# Patient Record
Sex: FEMALE | Race: WHITE | Employment: PART TIME | URBAN - METROPOLITAN AREA
[De-identification: names, ages, dates, MRNs, and addresses within clinical notes are randomized per-mention and may not be internally consistent; named-entity substitution may affect disease eponyms.]

---

## 2022-03-19 PROBLEM — F33.1 MODERATE EPISODE OF RECURRENT MAJOR DEPRESSIVE DISORDER (HCC): Status: ACTIVE | Noted: 2020-12-16

## 2022-03-19 PROBLEM — Z77.120 MOLD EXPOSURE: Status: ACTIVE | Noted: 2020-10-14

## 2023-02-04 ENCOUNTER — HOSPITAL ENCOUNTER (EMERGENCY)
Age: 28
Discharge: HOME | End: 2023-02-04
Attending: EMERGENCY MEDICINE

## 2023-02-04 ENCOUNTER — APPOINTMENT (OUTPATIENT)
Dept: ULTRASOUND IMAGING | Age: 28
End: 2023-02-04
Attending: EMERGENCY MEDICINE

## 2023-02-04 VITALS
DIASTOLIC BLOOD PRESSURE: 73 MMHG | OXYGEN SATURATION: 97 % | HEIGHT: 63 IN | HEART RATE: 98 BPM | TEMPERATURE: 98.6 F | RESPIRATION RATE: 16 BRPM | WEIGHT: 202.16 LBS | SYSTOLIC BLOOD PRESSURE: 108 MMHG | BODY MASS INDEX: 35.82 KG/M2

## 2023-02-04 DIAGNOSIS — Z3A.13 13 WEEKS GESTATION OF PREGNANCY: ICD-10-CM

## 2023-02-04 DIAGNOSIS — R10.30 LOWER ABDOMINAL PAIN: Primary | ICD-10-CM

## 2023-02-04 LAB
APPEARANCE UR: ABNORMAL
BACTERIA URNS QL MICRO: ABNORMAL /HPF
BASOPHILS # BLD: 0 K/UL (ref 0–0.1)
BASOPHILS NFR BLD: 0 % (ref 0–1)
BILIRUB UR QL: NEGATIVE
COLOR UR: ABNORMAL
DIFFERENTIAL METHOD BLD: ABNORMAL
EOSINOPHIL # BLD: 0.1 K/UL (ref 0–0.4)
EOSINOPHIL NFR BLD: 1 % (ref 0–7)
EPITH CASTS URNS QL MICRO: ABNORMAL /LPF
ERYTHROCYTE [DISTWIDTH] IN BLOOD BY AUTOMATED COUNT: 13.5 % (ref 11.5–14.5)
GLUCOSE UR STRIP.AUTO-MCNC: NEGATIVE MG/DL
HCG SERPL-ACNC: ABNORMAL MIU/ML (ref 0–6)
HCT VFR BLD AUTO: 36.9 % (ref 35–47)
HGB BLD-MCNC: 12.9 G/DL (ref 11.5–16)
HGB UR QL STRIP: NEGATIVE
HYALINE CASTS URNS QL MICRO: ABNORMAL /LPF (ref 0–2)
IMM GRANULOCYTES # BLD AUTO: 0.1 K/UL (ref 0–0.04)
IMM GRANULOCYTES NFR BLD AUTO: 1 % (ref 0–0.5)
KETONES UR QL STRIP.AUTO: ABNORMAL MG/DL
LEUKOCYTE ESTERASE UR QL STRIP.AUTO: NEGATIVE
LYMPHOCYTES # BLD: 1.7 K/UL (ref 0.8–3.5)
LYMPHOCYTES NFR BLD: 17 % (ref 12–49)
MCH RBC QN AUTO: 30.9 PG (ref 26–34)
MCHC RBC AUTO-ENTMCNC: 35 G/DL (ref 30–36.5)
MCV RBC AUTO: 88.5 FL (ref 80–99)
MONOCYTES # BLD: 0.5 K/UL (ref 0–1)
MONOCYTES NFR BLD: 5 % (ref 5–13)
NEUTS SEG # BLD: 7.8 K/UL (ref 1.8–8)
NEUTS SEG NFR BLD: 76 % (ref 32–75)
NITRITE UR QL STRIP.AUTO: NEGATIVE
NRBC # BLD: 0 K/UL (ref 0–0.01)
NRBC BLD-RTO: 0 PER 100 WBC
PH UR STRIP: 8 (ref 5–8)
PLATELET # BLD AUTO: 216 K/UL (ref 150–400)
PMV BLD AUTO: 9.3 FL (ref 8.9–12.9)
PROT UR STRIP-MCNC: ABNORMAL MG/DL
RBC # BLD AUTO: 4.17 M/UL (ref 3.8–5.2)
RBC #/AREA URNS HPF: ABNORMAL /HPF (ref 0–5)
SP GR UR REFRACTOMETRY: 1.02 (ref 1–1.03)
UR CULT HOLD, URHOLD: NORMAL
UROBILINOGEN UR QL STRIP.AUTO: 1 EU/DL (ref 0.2–1)
WBC # BLD AUTO: 10.1 K/UL (ref 3.6–11)
WBC URNS QL MICRO: ABNORMAL /HPF (ref 0–4)

## 2023-02-04 PROCEDURE — 99284 EMERGENCY DEPT VISIT MOD MDM: CPT

## 2023-02-04 PROCEDURE — 36415 COLL VENOUS BLD VENIPUNCTURE: CPT

## 2023-02-04 PROCEDURE — 81001 URINALYSIS AUTO W/SCOPE: CPT

## 2023-02-04 PROCEDURE — 84702 CHORIONIC GONADOTROPIN TEST: CPT

## 2023-02-04 PROCEDURE — 85025 COMPLETE CBC W/AUTO DIFF WBC: CPT

## 2023-02-04 PROCEDURE — 76801 OB US < 14 WKS SINGLE FETUS: CPT

## 2023-02-04 RX ORDER — SODIUM CHLORIDE 0.9 % (FLUSH) 0.9 %
5-40 SYRINGE (ML) INJECTION EVERY 8 HOURS
Status: DISCONTINUED | OUTPATIENT
Start: 2023-02-04 | End: 2023-02-05 | Stop reason: HOSPADM

## 2023-02-04 RX ORDER — SODIUM CHLORIDE 0.9 % (FLUSH) 0.9 %
5-40 SYRINGE (ML) INJECTION AS NEEDED
Status: DISCONTINUED | OUTPATIENT
Start: 2023-02-04 | End: 2023-02-05 | Stop reason: HOSPADM

## 2023-02-05 NOTE — ED TRIAGE NOTES
Ambulatory to triage and reports she believes her mucus plug came out around. Pt states she is 13 weeks pregnant. Pt also reports back cramping but denies any vaginal bleeding.

## 2023-02-05 NOTE — ED PROVIDER NOTES
Healthy. G4, . She estimates that she is currently 13 weeks pregnant (LMP 2022). She presents after noticing clear vaginal discharge earlier today. She questions whether she could have \"lost my mucous plug. \"  Over the past hour, she has noted some back and lower abdominal cramping. No vaginal bleeding. Her appetite has been poor for some time. She denies urinary symptoms. No nausea or vomiting. No past medical history on file. No past surgical history on file. No family history on file. Social History     Socioeconomic History    Marital status:      Spouse name: Not on file    Number of children: Not on file    Years of education: Not on file    Highest education level: Not on file   Occupational History    Not on file   Tobacco Use    Smoking status: Not on file    Smokeless tobacco: Not on file   Substance and Sexual Activity    Alcohol use: Not on file    Drug use: Not on file    Sexual activity: Not on file   Other Topics Concern    Not on file   Social History Narrative    Not on file     Social Determinants of Health     Financial Resource Strain: Not on file   Food Insecurity: Not on file   Transportation Needs: Not on file   Physical Activity: Not on file   Stress: Not on file   Social Connections: Not on file   Intimate Partner Violence: Not on file   Housing Stability: Not on file         ALLERGIES: Patient has no known allergies. Review of Systems   All other systems reviewed and are negative. Vitals:    23 2126   BP: 110/61   Pulse: 97   Resp: 18   Temp: 98.7 °F (37.1 °C)   SpO2: 97%   Weight: 91.7 kg (202 lb 2.6 oz)   Height: 5' 3\" (1.6 m)            Physical Exam  Vitals and nursing note reviewed. Constitutional:       Appearance: She is well-developed. HENT:      Head: Normocephalic and atraumatic. Eyes:      Conjunctiva/sclera: Conjunctivae normal.   Neck:      Trachea: No tracheal deviation.    Cardiovascular:      Rate and Rhythm: Normal rate and regular rhythm. Heart sounds: Normal heart sounds. No murmur heard. No friction rub. No gallop. Pulmonary:      Effort: Pulmonary effort is normal.      Breath sounds: Normal breath sounds. Abdominal:      Palpations: Abdomen is soft. Tenderness: There is no abdominal tenderness. Musculoskeletal:         General: No deformity. Cervical back: Neck supple. Skin:     General: Skin is warm and dry. Neurological:      Mental Status: She is alert. Comments: oriented        Medical Decision Making  Amount and/or Complexity of Data Reviewed  Labs: ordered. Radiology: ordered. ECG/medicine tests: ordered. Risk  Prescription drug management. Procedures    Progress Note:  Results, treatment, and follow up plan have been discussed with patient. Questions were answered. Ritu Haas MD  11:22 PM    Assessment/plan: 13 weeks pregnant. She presented over concerns about potentially having lost her mucous plug. She also complains of low abdominal and low back pain. Differential diagnosis includes ectopic pregnancy, miscarriage, normal pregnancy, and others. Reassuring appearance/exam with stable vital signs. CBC, quant, UA, ultrasound okay. Home with OB follow-up. Return precautions.   Ritu Haas MD  11:23 PM

## 2023-02-05 NOTE — ED NOTES
Discharge instructions reviewed with patient and or family member, opportunity for questions provided, IV removed, patient ambulatory.

## 2023-06-30 ENCOUNTER — HOSPITAL ENCOUNTER (OUTPATIENT)
Facility: HOSPITAL | Age: 28
Discharge: HOME | End: 2023-06-30
Attending: FAMILY MEDICINE | Admitting: FAMILY MEDICINE

## 2023-06-30 VITALS
SYSTOLIC BLOOD PRESSURE: 108 MMHG | TEMPERATURE: 97.9 F | OXYGEN SATURATION: 100 % | OXYGEN SATURATION: 100 % | RESPIRATION RATE: 16 BRPM | RESPIRATION RATE: 16 BRPM | DIASTOLIC BLOOD PRESSURE: 58 MMHG | HEART RATE: 85 BPM | SYSTOLIC BLOOD PRESSURE: 108 MMHG | DIASTOLIC BLOOD PRESSURE: 58 MMHG | TEMPERATURE: 97.9 F | HEART RATE: 85 BPM

## 2023-06-30 LAB
ALBUMIN SERPL-MCNC: 2.6 G/DL (ref 3.5–5)
ALBUMIN SERPL-MCNC: 2.6 G/DL (ref 3.5–5)
ALBUMIN/GLOB SERPL: 0.9 (ref 1.1–2.2)
ALBUMIN/GLOB SERPL: 0.9 (ref 1.1–2.2)
ALP SERPL-CCNC: 144 U/L (ref 45–117)
ALP SERPL-CCNC: 144 U/L (ref 45–117)
ALT SERPL-CCNC: 32 U/L (ref 12–78)
ALT SERPL-CCNC: 32 U/L (ref 12–78)
ANION GAP SERPL CALC-SCNC: 5 MMOL/L (ref 5–15)
ANION GAP SERPL CALC-SCNC: 5 MMOL/L (ref 5–15)
AST SERPL-CCNC: 31 U/L (ref 15–37)
AST SERPL-CCNC: 31 U/L (ref 15–37)
BASOPHILS # BLD: 0 K/UL (ref 0–0.1)
BASOPHILS # BLD: 0 K/UL (ref 0–0.1)
BASOPHILS NFR BLD: 0 % (ref 0–1)
BASOPHILS NFR BLD: 0 % (ref 0–1)
BILIRUB SERPL-MCNC: 1.1 MG/DL (ref 0.2–1)
BILIRUB SERPL-MCNC: 1.1 MG/DL (ref 0.2–1)
BUN SERPL-MCNC: 2 MG/DL (ref 6–20)
BUN SERPL-MCNC: 2 MG/DL (ref 6–20)
BUN/CREAT SERPL: 4 (ref 12–20)
BUN/CREAT SERPL: 4 (ref 12–20)
CALCIUM SERPL-MCNC: 8.5 MG/DL (ref 8.5–10.1)
CALCIUM SERPL-MCNC: 8.5 MG/DL (ref 8.5–10.1)
CHLORIDE SERPL-SCNC: 112 MMOL/L (ref 97–108)
CHLORIDE SERPL-SCNC: 112 MMOL/L (ref 97–108)
CO2 SERPL-SCNC: 24 MMOL/L (ref 21–32)
CO2 SERPL-SCNC: 24 MMOL/L (ref 21–32)
CREAT SERPL-MCNC: 0.55 MG/DL (ref 0.55–1.02)
CREAT SERPL-MCNC: 0.55 MG/DL (ref 0.55–1.02)
DIFFERENTIAL METHOD BLD: ABNORMAL
DIFFERENTIAL METHOD BLD: ABNORMAL
EOSINOPHIL # BLD: 0.1 K/UL (ref 0–0.4)
EOSINOPHIL # BLD: 0.1 K/UL (ref 0–0.4)
EOSINOPHIL NFR BLD: 1 % (ref 0–7)
EOSINOPHIL NFR BLD: 1 % (ref 0–7)
ERYTHROCYTE [DISTWIDTH] IN BLOOD BY AUTOMATED COUNT: 14 % (ref 11.5–14.5)
ERYTHROCYTE [DISTWIDTH] IN BLOOD BY AUTOMATED COUNT: 14 % (ref 11.5–14.5)
GLOBULIN SER CALC-MCNC: 2.8 G/DL (ref 2–4)
GLOBULIN SER CALC-MCNC: 2.8 G/DL (ref 2–4)
GLUCOSE SERPL-MCNC: 78 MG/DL (ref 65–100)
GLUCOSE SERPL-MCNC: 78 MG/DL (ref 65–100)
HCT VFR BLD AUTO: 33.4 % (ref 35–47)
HCT VFR BLD AUTO: 33.4 % (ref 35–47)
HGB BLD-MCNC: 11.2 G/DL (ref 11.5–16)
HGB BLD-MCNC: 11.2 G/DL (ref 11.5–16)
IMM GRANULOCYTES # BLD AUTO: 0 K/UL (ref 0–0.04)
IMM GRANULOCYTES # BLD AUTO: 0 K/UL (ref 0–0.04)
IMM GRANULOCYTES NFR BLD AUTO: 0 % (ref 0–0.5)
IMM GRANULOCYTES NFR BLD AUTO: 0 % (ref 0–0.5)
LYMPHOCYTES # BLD: 1.5 K/UL (ref 0.8–3.5)
LYMPHOCYTES # BLD: 1.5 K/UL (ref 0.8–3.5)
LYMPHOCYTES NFR BLD: 21 % (ref 12–49)
LYMPHOCYTES NFR BLD: 21 % (ref 12–49)
MCH RBC QN AUTO: 30 PG (ref 26–34)
MCH RBC QN AUTO: 30 PG (ref 26–34)
MCHC RBC AUTO-ENTMCNC: 33.5 G/DL (ref 30–36.5)
MCHC RBC AUTO-ENTMCNC: 33.5 G/DL (ref 30–36.5)
MCV RBC AUTO: 89.5 FL (ref 80–99)
MCV RBC AUTO: 89.5 FL (ref 80–99)
MONOCYTES # BLD: 0.4 K/UL (ref 0–1)
MONOCYTES # BLD: 0.4 K/UL (ref 0–1)
MONOCYTES NFR BLD: 5 % (ref 5–13)
MONOCYTES NFR BLD: 5 % (ref 5–13)
NEUTS SEG # BLD: 5.3 K/UL (ref 1.8–8)
NEUTS SEG # BLD: 5.3 K/UL (ref 1.8–8)
NEUTS SEG NFR BLD: 73 % (ref 32–75)
NEUTS SEG NFR BLD: 73 % (ref 32–75)
NRBC # BLD: 0 K/UL (ref 0–0.01)
NRBC # BLD: 0 K/UL (ref 0–0.01)
NRBC BLD-RTO: 0 PER 100 WBC
NRBC BLD-RTO: 0 PER 100 WBC
PLATELET # BLD AUTO: 144 K/UL (ref 150–400)
PLATELET # BLD AUTO: 144 K/UL (ref 150–400)
PMV BLD AUTO: 10.4 FL (ref 8.9–12.9)
PMV BLD AUTO: 10.4 FL (ref 8.9–12.9)
POTASSIUM SERPL-SCNC: 4 MMOL/L (ref 3.5–5.1)
POTASSIUM SERPL-SCNC: 4 MMOL/L (ref 3.5–5.1)
PROT SERPL-MCNC: 5.4 G/DL (ref 6.4–8.2)
PROT SERPL-MCNC: 5.4 G/DL (ref 6.4–8.2)
RBC # BLD AUTO: 3.73 M/UL (ref 3.8–5.2)
RBC # BLD AUTO: 3.73 M/UL (ref 3.8–5.2)
SODIUM SERPL-SCNC: 141 MMOL/L (ref 136–145)
SODIUM SERPL-SCNC: 141 MMOL/L (ref 136–145)
WBC # BLD AUTO: 7.3 K/UL (ref 3.6–11)
WBC # BLD AUTO: 7.3 K/UL (ref 3.6–11)

## 2023-06-30 PROCEDURE — 85025 COMPLETE CBC W/AUTO DIFF WBC: CPT

## 2023-06-30 PROCEDURE — 80053 COMPREHEN METABOLIC PANEL: CPT

## 2023-06-30 PROCEDURE — 36415 COLL VENOUS BLD VENIPUNCTURE: CPT

## 2023-06-30 PROCEDURE — 99284 EMERGENCY DEPT VISIT MOD MDM: CPT

## 2023-07-06 ENCOUNTER — ROUTINE PRENATAL (OUTPATIENT)
Age: 28
End: 2023-07-06

## 2023-07-06 VITALS
DIASTOLIC BLOOD PRESSURE: 64 MMHG | BODY MASS INDEX: 37.03 KG/M2 | WEIGHT: 209 LBS | SYSTOLIC BLOOD PRESSURE: 98 MMHG | RESPIRATION RATE: 16 BRPM | HEIGHT: 63 IN | HEART RATE: 72 BPM | TEMPERATURE: 98 F | OXYGEN SATURATION: 98 %

## 2023-07-06 DIAGNOSIS — O99.210 OBESITY COMPLICATING PREGNANCY, UNSPECIFIED TRIMESTER: ICD-10-CM

## 2023-07-06 DIAGNOSIS — O09.90 SUPERVISION OF HIGH RISK PREGNANCY, ANTEPARTUM: Primary | ICD-10-CM

## 2023-07-06 DIAGNOSIS — O99.019 ANTEPARTUM ANEMIA: ICD-10-CM

## 2023-07-06 PROCEDURE — 90715 TDAP VACCINE 7 YRS/> IM: CPT | Performed by: FAMILY MEDICINE

## 2023-07-06 NOTE — PROGRESS NOTES
Chief Complaint   Patient presents with    Routine Prenatal Visit     Patient is 35 weeks. She is not having vaginal bleeding or discharge. She is taking prenatal vitamins. She is having fetal movement. Patient is having mild contractions. No other concerns. Dizzy, lightheaded - went to triage, orthostatics okay and told to increase hydration  feels like drinking a lot, but with heat told to drink more  8hr shifts, on feet and walking a lot - drinks  irregular eating       26yo  at 35w0d by L/13     IUP: Rh pos  declines genetic testing  dating U/S in alternate chart  anatomy okay  GTT okay  Tdap today  Thickened NF: noted on anatomy, okay at f/up scan  Anemia: Mild, HgB 10.7, repeat 11.2  Short Interval Pregnancy: last delivery in [place]    Obesity in Pregnancy: early GTT 81, A1C 4.1  counseled on ASA and declines  baseline PIH labs - plt 175, Cr 0.54, AST/ALT okay, UPC 0.1, growth with MFM  Anxiety and Depression: previously on 200mg zoloft  helped but didn't make her feel good, affects driving, trouble with focus  continue to monitor closely   Limited Social Support: lives with grandparents, FOB only during supervised visits?    Thombocytopenia: suspect gestational as previously normal, stable on recheck   Lightheadedness: review recent hospital notes/labs, encouraged fluids/regular meals       Family Planning: Paraguard (will go to outside clinic - now [facility], may want to go to private practice)     Estimated Date of Delivery: 23   Other Chart MRN: [id number]

## 2023-07-14 ENCOUNTER — ROUTINE PRENATAL (OUTPATIENT)
Age: 28
End: 2023-07-14

## 2023-07-14 ENCOUNTER — HOSPITAL ENCOUNTER (OUTPATIENT)
Facility: HOSPITAL | Age: 28
Discharge: HOME | End: 2023-07-14
Attending: INTERNAL MEDICINE | Admitting: OBSTETRICS & GYNECOLOGY
Payer: MEDICAID

## 2023-07-14 VITALS
HEART RATE: 88 BPM | OXYGEN SATURATION: 98 % | SYSTOLIC BLOOD PRESSURE: 121 MMHG | BODY MASS INDEX: 37.38 KG/M2 | DIASTOLIC BLOOD PRESSURE: 75 MMHG | WEIGHT: 211 LBS | RESPIRATION RATE: 16 BRPM | TEMPERATURE: 98.2 F

## 2023-07-14 VITALS — SYSTOLIC BLOOD PRESSURE: 120 MMHG | TEMPERATURE: 98.1 F | DIASTOLIC BLOOD PRESSURE: 66 MMHG | HEART RATE: 105 BPM

## 2023-07-14 DIAGNOSIS — O09.90 SUPERVISION OF HIGH RISK PREGNANCY, ANTEPARTUM: Primary | ICD-10-CM

## 2023-07-14 PROCEDURE — 99283 EMERGENCY DEPT VISIT LOW MDM: CPT

## 2023-07-14 PROCEDURE — 59025 FETAL NON-STRESS TEST: CPT

## 2023-07-14 NOTE — PROGRESS NOTES
Chief Complaint   Patient presents with    Routine Prenatal Visit     Normal FM, no LOF/VB  some contractions and pelvic pressure last night for a few hours     28yo  at 36w1d by L/13     IUP: Rh pos  declines genetic testing  dating U/S in alternate chart  anatomy okay  GTT okay  s/p Tdap  GBS today   Thickened NF: noted on anatomy, okay at f/up scan  Anemia: Mild, HgB 10.7, repeat 11.2  Short Interval Pregnancy: last delivery in [place]    Obesity in Pregnancy: early GTT 81, A1C 4.1  counseled on ASA and declines  baseline PIH labs - plt 175, Cr 0.54, AST/ALT okay, UPC 0.1, growth with MFM scheduled  Anxiety and Depression: previously on 200mg zoloft  helped but didn't make her feel good, affects driving, trouble with focus  continue to monitor closely   Limited Social Support: lives with grandparents, FOB only during supervised visits?    Thombocytopenia: suspect gestational as previously normal, stable on recheck   Lightheadedness: review recent hospital notes/labs, encouraged fluids/regular meals      Family Planning: Marisol (will go to outside clinic - now [facility], may want to go to private practice)    Estimated Date of Delivery: 23  Other Chart MRN: [id number]

## 2023-07-15 NOTE — H&P
[address], [address] [facility]   Office [phone], Fax [phone]      History & Physical    Name: Corinne Ho MRN: [id number]  SSN: [id number]    YOB: 1995  Age: 32 y.o. Sex: female      Subjective:     Reason for Admission:  Pregnancy and Rule out Labor    History of Present Illness: Ms. Daniel Yarbrough is a 32 y.o. [id number] with an estimated gestational age of 45w4d with Estimated Date of Delivery: 8/10/23. Patient complains of mild contractions that started 2 hours ago. Lasted for one 30 minute interval during which were felt \"every 1-1.5 minutes\". She no longer feels these contractions. At the time, she was walking around the mall for a few hours after a long day at work. Admits to bilateral leg swelling, worsened after spending long hours on her feet. Swelling goes away with leg elevation. Pregnancy has been complicated by Anemia (50/05: Hbg 10.7 > 11.2), Short Interval Pregnancy (last 2022), Obesity (GTT 81, A1c 4.1, decline Asprin use), Anxiety/Depression (not on medication), Thrombocytopenia (now stable). No loss of fluid. No vaginal bleeding. Strong fetal movement. Patient denies abdominal pain  , chest pain, headache , nausea and vomiting, right upper quadrant pain  , and shortness of breath. OB History    Para Term  AB Living   4 2 2 0 1 2   SAB IAB Ectopic Molar Multiple Live Births   1 0 0 0   2      # Outcome Date GA Lbr Oscar/2nd Weight Sex Delivery Anes PTL Lv   4 Current            3 Term 22    F Vag-Spont   SERGEI   2 Term 20 39w4d  2.885 kg (6 lb 5.8 oz) M Vag-Spont EPI N SERGEI   1 SAB              Past Medical History:   Diagnosis Date    Depression     Moderate episode of recurrent major depressive disorder ([address]) 2020    Postpartum depression 2020    Postpartum depression     Vaginal delivery 2020     No past surgical history on file.   Social History     Occupational History    Not on file   Tobacco Use

## 2023-07-15 NOTE — PROGRESS NOTES
2220  Patient arrives to L&D reporting many painful contractions today as she went to the mall with her children. 2225 SVE 2/50/-3  2240  resident MD at bedside assessing patient progress and the satrip. [facility] at bedside educating patient. 2315  Discharge instructions and teaching.   2318  patient left L&D

## 2023-07-16 LAB
BACTERIA SPEC CULT: NORMAL
SERVICE CMNT-IMP: NORMAL

## 2023-07-18 LAB
BACTERIA SPEC CULT: NORMAL
SERVICE CMNT-IMP: NORMAL

## 2023-07-21 ENCOUNTER — ROUTINE PRENATAL (OUTPATIENT)
Age: 28
End: 2023-07-21

## 2023-07-21 VITALS
TEMPERATURE: 97.7 F | HEART RATE: 84 BPM | BODY MASS INDEX: 37.56 KG/M2 | OXYGEN SATURATION: 98 % | DIASTOLIC BLOOD PRESSURE: 62 MMHG | SYSTOLIC BLOOD PRESSURE: 96 MMHG | RESPIRATION RATE: 18 BRPM | HEIGHT: 63 IN | WEIGHT: 212 LBS

## 2023-07-21 DIAGNOSIS — O99.019 ANTEPARTUM ANEMIA: ICD-10-CM

## 2023-07-21 DIAGNOSIS — O99.210 OBESITY COMPLICATING PREGNANCY, UNSPECIFIED TRIMESTER: ICD-10-CM

## 2023-07-21 DIAGNOSIS — O09.90 SUPERVISION OF HIGH RISK PREGNANCY, ANTEPARTUM: Primary | ICD-10-CM

## 2023-07-21 PROCEDURE — 0502F SUBSEQUENT PRENATAL CARE: CPT | Performed by: FAMILY MEDICINE

## 2023-07-21 NOTE — PROGRESS NOTES
Chief Complaint   Patient presents with    Routine Prenatal Visit     Patient is 37 weeks and 1 day. She is not having vaginal bleeding or discharge. She is having fetal movement. She is taking her prenatal vitamins. No contractions. No other concerns.        25yo G4 [id number] at 37w1d by L/13     IUP: Rh pos  declines genetic testing  dating U/S in alternate chart  anatomy okay  GTT okay  s/p Tdap  GBS neg   Thickened NF: noted on anatomy, okay at f/up scan  Anemia: Mild, HgB 10.7, repeat 11.2  Short Interval Pregnancy: last delivery in [place] 4/22   Obesity in Pregnancy: early GTT 81, A1C 4.1  counseled on ASA and declines  baseline PIH labs - plt 175, Cr 0.54, AST/ALT okay, UPC 0.1, growth with MFM scheduled 7/27  Anxiety and Depression: previously on 200mg zoloft  helped but didn't make her feel good, affects driving, trouble with focus  continue to monitor closely   Limited Social Support: only support is grandparents, works at [place], FOB only during supervised visits (sees some weekends when she works), in process of getting divorce  moved into home by herself  FOB has missed multiple court appearances for custody/child support   Thombocytopenia: suspect gestational as previously normal, stable on recheck 6/30 - 133>143>144     Family Planning: Rikki Kawasaki (will go to outside clinic - now [facility], may want to go to private practice)    Estimated Date of Delivery: 8/11/23  Other Chart MRN: [id number]

## 2023-07-26 ENCOUNTER — ROUTINE PRENATAL (OUTPATIENT)
Age: 28
End: 2023-07-26

## 2023-07-26 VITALS
TEMPERATURE: 97.8 F | RESPIRATION RATE: 18 BRPM | HEIGHT: 63 IN | SYSTOLIC BLOOD PRESSURE: 95 MMHG | WEIGHT: 212 LBS | HEART RATE: 67 BPM | DIASTOLIC BLOOD PRESSURE: 60 MMHG | OXYGEN SATURATION: 98 % | BODY MASS INDEX: 37.56 KG/M2

## 2023-07-26 DIAGNOSIS — O99.210 OBESITY COMPLICATING PREGNANCY, UNSPECIFIED TRIMESTER: ICD-10-CM

## 2023-07-26 DIAGNOSIS — O09.90 SUPERVISION OF HIGH RISK PREGNANCY, ANTEPARTUM: Primary | ICD-10-CM

## 2023-07-26 DIAGNOSIS — O99.019 ANTEPARTUM ANEMIA: ICD-10-CM

## 2023-07-26 PROCEDURE — 0502F SUBSEQUENT PRENATAL CARE: CPT | Performed by: FAMILY MEDICINE

## 2023-07-26 SDOH — ECONOMIC STABILITY: HOUSING INSECURITY
IN THE LAST 12 MONTHS, WAS THERE A TIME WHEN YOU DID NOT HAVE A STEADY PLACE TO SLEEP OR SLEPT IN A SHELTER (INCLUDING NOW)?: NO

## 2023-07-26 SDOH — ECONOMIC STABILITY: FOOD INSECURITY: WITHIN THE PAST 12 MONTHS, YOU WORRIED THAT YOUR FOOD WOULD RUN OUT BEFORE YOU GOT MONEY TO BUY MORE.: NEVER TRUE

## 2023-07-26 SDOH — ECONOMIC STABILITY: FOOD INSECURITY: WITHIN THE PAST 12 MONTHS, THE FOOD YOU BOUGHT JUST DIDN'T LAST AND YOU DIDN'T HAVE MONEY TO GET MORE.: NEVER TRUE

## 2023-07-26 SDOH — ECONOMIC STABILITY: INCOME INSECURITY: HOW HARD IS IT FOR YOU TO PAY FOR THE VERY BASICS LIKE FOOD, HOUSING, MEDICAL CARE, AND HEATING?: HARD

## 2023-07-26 ASSESSMENT — PATIENT HEALTH QUESTIONNAIRE - PHQ9
SUM OF ALL RESPONSES TO PHQ QUESTIONS 1-9: 6
1. LITTLE INTEREST OR PLEASURE IN DOING THINGS: 3
SUM OF ALL RESPONSES TO PHQ9 QUESTIONS 1 & 2: 6
2. FEELING DOWN, DEPRESSED OR HOPELESS: 3
SUM OF ALL RESPONSES TO PHQ QUESTIONS 1-9: 6

## 2023-07-26 NOTE — PROGRESS NOTES
Chief Complaint   Patient presents with    Routine Prenatal Visit     Patient is 37 weeks and 6 days. She is not having vaginal bleeding or discharge. She is taking her prenatal vitamins. She is having fetal movement. No contractions. Patient is having a lot of pain in her lower back. She also says she eels very sore on her pelvis. This started about a week ago. No other concerns.        25yo G4 [id number] at 37w6d by L/13     IUP: Rh pos  declines genetic testing  dating U/S in alternate chart  anatomy okay  GTT okay  s/p Tdap  GBS neg  6/80/-1, cephalic   Thickened NF: noted on anatomy, okay at f/up scan  Anemia: Mild, HgB 10.7, repeat 11.2  Short Interval Pregnancy: last delivery in [place] 4/22   Obesity in Pregnancy: early GTT 81, A1C 4.1  counseled on ASA and declines  baseline PIH labs - plt 175, Cr 0.54, AST/ALT okay, UPC 0.1, growth with MFM scheduled 7/27 - patient had to cancel and no availability to reschedule (largest baby [id number])  Anxiety and Depression: previously on 200mg zoloft  helped but didn't make her feel good, affects driving, trouble with focus  continue to monitor closely   Limited Social Support: lives with grandparents, FOB only during supervised visits, in process of getting divorce   Thombocytopenia: suspect gestational as previously normal, stable on recheck 6/30 - 133>143>144     Family Planning: Seferino Hanson (will go to outside clinic - now [facility], may want to go to private practice)    Estimated Date of Delivery: 8/11/23  Other Chart MRN: [id number]

## 2023-08-04 ENCOUNTER — ROUTINE PRENATAL (OUTPATIENT)
Age: 28
End: 2023-08-04

## 2023-08-04 VITALS
BODY MASS INDEX: 37.74 KG/M2 | TEMPERATURE: 98.1 F | HEART RATE: 75 BPM | HEIGHT: 63 IN | DIASTOLIC BLOOD PRESSURE: 67 MMHG | SYSTOLIC BLOOD PRESSURE: 106 MMHG | OXYGEN SATURATION: 99 % | RESPIRATION RATE: 18 BRPM | WEIGHT: 213 LBS

## 2023-08-04 DIAGNOSIS — O99.210 OBESITY COMPLICATING PREGNANCY, UNSPECIFIED TRIMESTER: ICD-10-CM

## 2023-08-04 DIAGNOSIS — O09.90 SUPERVISION OF HIGH RISK PREGNANCY, ANTEPARTUM: Primary | ICD-10-CM

## 2023-08-04 NOTE — PROGRESS NOTES
Chief Complaint   Patient presents with    Routine Prenatal Visit     Patient is 39 weeks. She is not having vaginal bleeding or discharge. She is taking her prenatal vitamins. She is having fetal movement. No contractions. She said part o her mucous plug came out. No other concerns.        27yo G4 [id number] at 39w0d by L/13     IUP: Rh pos  declines genetic testing  dating U/S in alternate chart  anatomy okay  GTT okay  s/p Tdap  GBS neg  7/33/-5, cephalic  membrane sweep today - reviewed risks/benefits prior   PDIOL scheduled 8/17 to arrive 6:30am (full earlier in week)  Thickened NF: noted on anatomy, okay at f/up scan  Anemia: Mild, HgB 10.7, repeat 11.2  Short Interval Pregnancy: last delivery in [place] 4/22   Obesity in Pregnancy: early GTT 81, A1C 4.1  counseled on ASA and declines  baseline PIH labs - plt 175, Cr 0.54, AST/ALT okay, UPC 0.1, growth with MFM scheduled 7/27 - patient had to cancel and no availability to reschedule (largest baby [id number])  Anxiety and Depression: previously on 200mg zoloft  helped but didn't make her feel good, affects driving, trouble with focus  continue to monitor closely   Limited Social Support: lives with grandparents, FOB only during supervised visits, in process of getting divorce   Thombocytopenia: suspect gestational as previously normal, stable on recheck 6/30 - 133>143>144     Family Planning: Aila Villarreal (will go to outside clinic - now [facility], may want to go to private practice)    Estimated Date of Delivery: 8/11/23  Other Chart MRN: [id number]

## 2023-08-11 ENCOUNTER — HOSPITAL ENCOUNTER (INPATIENT)
Facility: HOSPITAL | Age: 28
LOS: 2 days | Discharge: HOME | DRG: 542 | End: 2023-08-13
Attending: FAMILY MEDICINE | Admitting: FAMILY MEDICINE
Payer: MEDICAID

## 2023-08-11 PROBLEM — Z37.9 NORMAL LABOR: Status: ACTIVE | Noted: 2023-08-11

## 2023-08-11 LAB
ABO + RH BLD: NORMAL
BASOPHILS # BLD: 0 K/UL (ref 0–0.1)
BASOPHILS NFR BLD: 1 % (ref 0–1)
BLOOD GROUP ANTIBODIES SERPL: NORMAL
DIFFERENTIAL METHOD BLD: ABNORMAL
EOSINOPHIL # BLD: 0.1 K/UL (ref 0–0.4)
EOSINOPHIL NFR BLD: 1 % (ref 0–7)
ERYTHROCYTE [DISTWIDTH] IN BLOOD BY AUTOMATED COUNT: 14.3 % (ref 11.5–14.5)
HCT VFR BLD AUTO: 31.1 % (ref 35–47)
HGB BLD-MCNC: 10.6 G/DL (ref 11.5–16)
IMM GRANULOCYTES # BLD AUTO: 0 K/UL (ref 0–0.04)
IMM GRANULOCYTES NFR BLD AUTO: 0 % (ref 0–0.5)
LYMPHOCYTES # BLD: 2.3 K/UL (ref 0.8–3.5)
LYMPHOCYTES NFR BLD: 29 % (ref 12–49)
MCH RBC QN AUTO: 28.3 PG (ref 26–34)
MCHC RBC AUTO-ENTMCNC: 34.1 G/DL (ref 30–36.5)
MCV RBC AUTO: 83.2 FL (ref 80–99)
MONOCYTES # BLD: 0.5 K/UL (ref 0–1)
MONOCYTES NFR BLD: 6 % (ref 5–13)
NEUTS SEG # BLD: 5.1 K/UL (ref 1.8–8)
NEUTS SEG NFR BLD: 63 % (ref 32–75)
NRBC # BLD: 0 K/UL (ref 0–0.01)
NRBC BLD-RTO: 0 PER 100 WBC
PLATELET # BLD AUTO: 135 K/UL (ref 150–400)
PMV BLD AUTO: 12 FL (ref 8.9–12.9)
RBC # BLD AUTO: 3.74 M/UL (ref 3.8–5.2)
SPECIMEN EXP DATE BLD: NORMAL
WBC # BLD AUTO: 8.1 K/UL (ref 3.6–11)

## 2023-08-11 PROCEDURE — 85025 COMPLETE CBC W/AUTO DIFF WBC: CPT

## 2023-08-11 PROCEDURE — 2500000003 HC RX 250 WO HCPCS

## 2023-08-11 PROCEDURE — 86900 BLOOD TYPING SEROLOGIC ABO: CPT

## 2023-08-11 PROCEDURE — 36415 COLL VENOUS BLD VENIPUNCTURE: CPT

## 2023-08-11 PROCEDURE — 4500000002 HC ER NO CHARGE

## 2023-08-11 PROCEDURE — 4A1HXCZ MONITORING OF PRODUCTS OF CONCEPTION, CARDIAC RATE, EXTERNAL APPROACH: ICD-10-PCS

## 2023-08-11 PROCEDURE — 2580000003 HC RX 258

## 2023-08-11 PROCEDURE — 0DQR0ZZ REPAIR ANAL SPHINCTER, OPEN APPROACH: ICD-10-PCS

## 2023-08-11 PROCEDURE — 1100000000 HC RM PRIVATE

## 2023-08-11 PROCEDURE — 86901 BLOOD TYPING SEROLOGIC RH(D): CPT

## 2023-08-11 PROCEDURE — 7220000101 HC DELIVERY VAGINAL/SINGLE: Performed by: OBSTETRICS & GYNECOLOGY

## 2023-08-11 PROCEDURE — 86850 RBC ANTIBODY SCREEN: CPT

## 2023-08-11 PROCEDURE — 6370000000 HC RX 637 (ALT 250 FOR IP): Performed by: OBSTETRICS & GYNECOLOGY

## 2023-08-11 PROCEDURE — 6370000000 HC RX 637 (ALT 250 FOR IP)

## 2023-08-11 PROCEDURE — 6360000002 HC RX W HCPCS

## 2023-08-11 PROCEDURE — 7210000100 HC LABOR FEE PER 1 HR: Performed by: OBSTETRICS & GYNECOLOGY

## 2023-08-11 PROCEDURE — 99285 EMERGENCY DEPT VISIT HI MDM: CPT

## 2023-08-11 RX ORDER — SODIUM CHLORIDE, SODIUM LACTATE, POTASSIUM CHLORIDE, CALCIUM CHLORIDE 600; 310; 30; 20 MG/100ML; MG/100ML; MG/100ML; MG/100ML
INJECTION, SOLUTION INTRAVENOUS CONTINUOUS
Status: DISCONTINUED | OUTPATIENT
Start: 2023-08-11 | End: 2023-08-13 | Stop reason: HOSPADM

## 2023-08-11 RX ORDER — IBUPROFEN 800 MG/1
800 TABLET ORAL EVERY 8 HOURS PRN
Status: DISCONTINUED | OUTPATIENT
Start: 2023-08-11 | End: 2023-08-13 | Stop reason: HOSPADM

## 2023-08-11 RX ORDER — SODIUM CHLORIDE 0.9 % (FLUSH) 0.9 %
5-40 SYRINGE (ML) INJECTION EVERY 12 HOURS SCHEDULED
Status: DISCONTINUED | OUTPATIENT
Start: 2023-08-11 | End: 2023-08-13 | Stop reason: HOSPADM

## 2023-08-11 RX ORDER — SODIUM CHLORIDE 9 MG/ML
INJECTION, SOLUTION INTRAVENOUS PRN
Status: DISCONTINUED | OUTPATIENT
Start: 2023-08-11 | End: 2023-08-13 | Stop reason: HOSPADM

## 2023-08-11 RX ORDER — DOCUSATE SODIUM 100 MG/1
100 CAPSULE, LIQUID FILLED ORAL 2 TIMES DAILY
Status: DISCONTINUED | OUTPATIENT
Start: 2023-08-11 | End: 2023-08-13 | Stop reason: HOSPADM

## 2023-08-11 RX ORDER — POLYETHYLENE GLYCOL 3350 17 G/17G
17 POWDER, FOR SOLUTION ORAL DAILY
Status: DISCONTINUED | OUTPATIENT
Start: 2023-08-11 | End: 2023-08-12

## 2023-08-11 RX ORDER — SODIUM CHLORIDE 0.9 % (FLUSH) 0.9 %
5-40 SYRINGE (ML) INJECTION PRN
Status: DISCONTINUED | OUTPATIENT
Start: 2023-08-11 | End: 2023-08-13 | Stop reason: HOSPADM

## 2023-08-11 RX ORDER — IBUPROFEN 800 MG/1
TABLET ORAL
Status: DISPENSED
Start: 2023-08-11 | End: 2023-08-12

## 2023-08-11 RX ORDER — IBUPROFEN 800 MG/1
800 TABLET ORAL EVERY 8 HOURS SCHEDULED
Status: DISCONTINUED | OUTPATIENT
Start: 2023-08-11 | End: 2023-08-13 | Stop reason: HOSPADM

## 2023-08-11 RX ORDER — LANOLIN/MINERAL OIL
LOTION (ML) TOPICAL PRN
Status: DISCONTINUED | OUTPATIENT
Start: 2023-08-11 | End: 2023-08-13 | Stop reason: HOSPADM

## 2023-08-11 RX ORDER — OXYCODONE HYDROCHLORIDE 5 MG/1
10 TABLET ORAL EVERY 4 HOURS PRN
Status: DISCONTINUED | OUTPATIENT
Start: 2023-08-11 | End: 2023-08-13 | Stop reason: HOSPADM

## 2023-08-11 RX ORDER — OXYCODONE HYDROCHLORIDE 5 MG/1
5 TABLET ORAL EVERY 4 HOURS PRN
Status: DISCONTINUED | OUTPATIENT
Start: 2023-08-11 | End: 2023-08-13 | Stop reason: HOSPADM

## 2023-08-11 RX ADMIN — IBUPROFEN 800 MG: 800 TABLET, FILM COATED ORAL at 14:15

## 2023-08-11 RX ADMIN — LIDOCAINE HYDROCHLORIDE 10 ML: 10 INJECTION, SOLUTION EPIDURAL; INFILTRATION; INTRACAUDAL; PERINEURAL at 05:00

## 2023-08-11 RX ADMIN — OXYTOCIN 30 UNITS: 10 INJECTION, SOLUTION INTRAMUSCULAR; INTRAVENOUS at 05:00

## 2023-08-11 RX ADMIN — IBUPROFEN 800 MG: 800 TABLET, FILM COATED ORAL at 05:34

## 2023-08-11 ASSESSMENT — PAIN DESCRIPTION - LOCATION: LOCATION: VAGINA

## 2023-08-11 ASSESSMENT — PAIN SCALES - GENERAL: PAINLEVEL_OUTOF10: 3

## 2023-08-11 ASSESSMENT — PAIN DESCRIPTION - DESCRIPTORS: DESCRIPTORS: ACHING

## 2023-08-11 NOTE — LACTATION NOTE
This note was copied from a baby's chart. Mother reports that nursing is going well. This is her third child to breast feed. She is currently nursing in a cradle hold. LC changed mother's position to biological hold to be more comfortable for both mom and baby. Mother encouraged to breast feed baby on demand or every 2-3 hours. Hand expression encouraged. A breastfeeding booklet was provided. Biological Nurturing breastfeeding principles taught. How Biological Nurturing (BN)promotes optimal breastfeeding (BF) sessions discussed. Mother encouraged to seek comfortable semi-reclining breastfeeding positions. Infant placed frontally along maternal contour. Primitive innate feeding reflexes/behaviors of the  discussed. BN tips and techniques shared; assisted with comfortable breastfeeding positioning. Pt will successfully establish breastfeeding by feeding in response to early feeding cues   or wake every 3h, will obtain deep latch, and will keep log of feedings/output. Taught to BF at hunger cues and or q 2-3 hrs and to offer 10-20 drops of hand expressed colostrum at any non-feeds. Left Breast: Soft  Left Nipple: Protrude  Right Nipple: Protrude  Right Breast: Soft  Position and Latch: With assistance     Maternal Response:  Comfortable with position           Latch: Repeated attempts, hold nipple in mouth, stimulate to suck  Audible Swallowing: None  Type of Nipple: Everted (after stimulation)  Comfort (Breast/Nipple): Soft/non-tender  Hold (Positioning): Full assist, teach one side, mother does other, staff holds  [facility] Score: 6     Care Plan Initiated: Reluctant nurser  Lactation Comment: [address] worked with mother nursing in the [address] [place] [address] position      Discussed with mother her plan for feeding. Reviewed the benefits of exclusive breast milk feeding during the hospital stay.    Informed her of the risks of using formula to supplement in the first few days of life as well as the

## 2023-08-11 NOTE — PROGRESS NOTES
5115: Pt arrived to L&D reporting frequent painful contractions and LOF. Pt denies VB, DFM, or complications with her pregnancy    0450: Dr. Karishma Aguilar called to bedside. Patient actively pushing. RN remains in continuous attendance at the bedside. Assessment & evaluation of fetal heart rate ongoing via continuous EFM. 12: RN remained at bedside throughout pushing. EFM continuously assessed. Vaginal delivery of viable infant.   ml

## 2023-08-11 NOTE — H&P
[facility] [address] [place] [address]   Office [phone], Fax [phone]      History & Physical    NOTE: This H&P was written after the delivery. Name: Alexey Hsu MRN: [id number]  SSN: [id number]    YOB: 1995  Age: 32 y.o. Sex: female      Subjective:     Reason for Admission:  Pregnancy and contractions and SROM    History of Present Illness: Ms. Cyrus العراقي is a 32 y.o.   female with an estimated gestational age of 37w0d with Estimated Date of Delivery: 23. Patient complains of SROM while asleep and contractions that started at 3 AM today. OB History    Para Term  AB Living   4 2 2 0 1 2   SAB IAB Ectopic Molar Multiple Live Births   1 0 0 0   2      # Outcome Date GA Lbr Oscar/2nd Weight Sex Delivery Anes PTL Lv   4 Current            3 Term 22    F Vag-Spont   SERGEI   2 Term 20 39w4d  2.885 kg (6 lb 5.8 oz) M Vag-Spont EPI N SERGEI   1 SAB              Past Medical History:   Diagnosis Date    Depression     Moderate episode of recurrent major depressive disorder ([address]) 2020    Postpartum depression 2020    Postpartum depression     Vaginal delivery 2020     No past surgical history on file. Social History     Occupational History    Not on file   Tobacco Use    Smoking status: Former     Packs/day: 0.50     Types: Cigarettes     Quit date: 2020     Years since quitting: 3.3    Smokeless tobacco: Never    Tobacco comments:     Quit smoking: patient vapes   Substance and Sexual Activity    Alcohol use: Not Currently    Drug use: Never    Sexual activity: Not on file      Family History   Problem Relation Age of Onset    Diabetes Father     Other Mother         fibroids, hysterectomy    Diabetes Paternal Grandfather     Diabetes Maternal Grandfather        No Known Allergies  Prior to Admission medications    Medication Sig Start Date End Date Taking?  Authorizing Provider   ferrous sulfate 324 (65 Fe) MG

## 2023-08-11 NOTE — L&D DELIVERY NOTE
Patient progressed well to C/C/+2 and pushed for approximately 5 min w/  over a 3rd degree perineal laceration of a liveborn female infant, Apgar scores were 8/9. Meconium stained large amount of fluid after delivery of head. Head delivered slightly JUAN CARLOS. Anterior left shoulder delivered w/ single maternal effort w/ posterior shoulder and body following easily. Cord wrapped around the trunk, manually reduced. Infant placed on maternal abdomen. Delayed cord clamping x 1 min. Cord clamped x 2 and cut by family. Pitocin infusion initiated. Meconium stained placenta delivered spontaneously intact w/ 3 v cord. Perineum inspected. Fundus firm at U. Mother and baby bonding in LDR. Delivery QBL 200cc. Mason Welch [[id number]]      Labor Events     Labor: No   Steroids: None  Cervical Ripening Date/Time:      Antibiotics Received during Labor: No  Rupture Identifier: Sac 1  Rupture Date/Time:      Rupture Type: SROM  Meconium Consistency: Moderate  Fluid Odor: None  Fluid Volume:  Moderate  Induction: None  Augmentation: None  Labor Complications: None              Anesthesia    Method: None       Delivery Details      Delivery Date: 23 Delivery Time: 04:55:00   Delivery Type: Vaginal, Spontaneous              Hallock Presentation    Presentation: Vertex       Shoulder Dystocia    Shoulder Dystocia Present?: No       Assisted Delivery Details    Forceps Attempted?: No  Vacuum Extractor Attempted?: No                           Cord    Vessels: 3 Vessels  Complications: None  Delayed Cord Clamping?: Yes  Cord Clamped Date/Time: 2023 04:56:00  Cord Blood Disposition: Lab  Gases Sent?: No              Placenta    Date/Time: 2023 05:01:00  Removal: Expressed  Appearance: Intact  Disposition: Discarded       Lacerations    Episiotomy: None  Perineal Lacerations: 2nd  Other Lacerations: no non-perineal laceration       Vaginal Counts    Initial Count Personnel: Kirt Stubbs RN  Initial

## 2023-08-11 NOTE — CARE COORDINATION
8/11/23  3:19 PM    CM met with LC to complete the initial evaluation. Confirmed charted demographics. LC is able to stay at home with the baby for six weeks. She plans to breast feed and bottle feed and has a pump. LC has all of the necessary equipment for the baby including a car seat, crib, clothing, etc. LC is aware of how to add the baby to her health insurance is [facility] pediatric[facility]. LC has the support of her mother and grandmother if she should need anything. She had questions regarding early intervention services- CM added the information to the AVS.     [facility] Manager     08/11/23 [id number]   Service Assessment   Patient Orientation Alert and Oriented   Cognition Alert   History Provided By Patient   Primary Caregiver [address] Parent; Family Members   Prior Functional Level Independent in ADLs/IADLs   Current Functional Level Independent in ADLs/IADLs   Would you like for me to discuss the discharge plan with any other family members/significant others, and if so, who?  No   Services At/After Discharge   Mode of Transport at Discharge Other (see comment)  (family will transport MOB and baby at [place])

## 2023-08-12 PROCEDURE — 1100000000 HC RM PRIVATE

## 2023-08-12 PROCEDURE — 6370000000 HC RX 637 (ALT 250 FOR IP)

## 2023-08-12 RX ORDER — POLYETHYLENE GLYCOL 3350 17 G/17G
17 POWDER, FOR SOLUTION ORAL DAILY PRN
Status: DISCONTINUED | OUTPATIENT
Start: 2023-08-12 | End: 2023-08-13 | Stop reason: HOSPADM

## 2023-08-12 RX ADMIN — IBUPROFEN 800 MG: 800 TABLET, FILM COATED ORAL at 20:25

## 2023-08-12 RX ADMIN — IBUPROFEN 800 MG: 800 TABLET, FILM COATED ORAL at 12:16

## 2023-08-12 RX ADMIN — SERTRALINE 50 MG: 50 TABLET, FILM COATED ORAL at 12:15

## 2023-08-12 RX ADMIN — DOCUSATE SODIUM 100 MG: 100 CAPSULE, LIQUID FILLED ORAL at 08:11

## 2023-08-12 ASSESSMENT — PAIN DESCRIPTION - DESCRIPTORS
DESCRIPTORS: ACHING;BURNING;CRAMPING
DESCRIPTORS: SORE

## 2023-08-12 ASSESSMENT — PAIN SCALES - GENERAL
PAINLEVEL_OUTOF10: 3
PAINLEVEL_OUTOF10: 3

## 2023-08-12 ASSESSMENT — PAIN DESCRIPTION - ORIENTATION: ORIENTATION: LOWER

## 2023-08-12 ASSESSMENT — PAIN DESCRIPTION - LOCATION
LOCATION: PERINEUM
LOCATION: ABDOMEN

## 2023-08-12 NOTE — LACTATION NOTE
This note was copied from a baby's chart. LC in to follow up with feedings. Family member was formula feeding baby. Mother states she changed her mind about breastfeeding and now only wants to formula feed baby. LC discussed hand expressing drops of colostrum and or pumping to give baby her antibodies. Mother states she will call [address] if she changes her mind. Mother has LC#.

## 2023-08-13 VITALS
DIASTOLIC BLOOD PRESSURE: 75 MMHG | HEART RATE: 53 BPM | RESPIRATION RATE: 16 BRPM | OXYGEN SATURATION: 98 % | SYSTOLIC BLOOD PRESSURE: 124 MMHG | TEMPERATURE: 98.8 F

## 2023-08-13 PROCEDURE — 6370000000 HC RX 637 (ALT 250 FOR IP)

## 2023-08-13 RX ORDER — HYDROCODONE BITARTRATE AND ACETAMINOPHEN 5; 325 MG/1; MG/1
1 TABLET ORAL EVERY 8 HOURS PRN
Qty: 18 TABLET | Refills: 0 | Status: SHIPPED | OUTPATIENT
Start: 2023-08-13 | End: 2023-08-28

## 2023-08-13 RX ORDER — PSEUDOEPHEDRINE HCL 30 MG
100 TABLET ORAL 4 TIMES DAILY PRN
Qty: 20 CAPSULE | Refills: 0 | Status: SHIPPED | OUTPATIENT
Start: 2023-08-13

## 2023-08-13 RX ORDER — IBUPROFEN 800 MG/1
800 TABLET ORAL EVERY 8 HOURS SCHEDULED
Qty: 120 TABLET | Refills: 3 | Status: SHIPPED | OUTPATIENT
Start: 2023-08-13

## 2023-08-13 RX ADMIN — IBUPROFEN 800 MG: 800 TABLET, FILM COATED ORAL at 04:34

## 2023-08-13 ASSESSMENT — PAIN DESCRIPTION - LOCATION: LOCATION: PERINEUM

## 2023-08-13 ASSESSMENT — PAIN SCALES - GENERAL: PAINLEVEL_OUTOF10: 4

## 2023-08-13 ASSESSMENT — PAIN DESCRIPTION - DESCRIPTORS: DESCRIPTORS: SORE

## 2023-08-13 NOTE — DISCHARGE INSTRUCTIONS
contact my physician. These instructions were explained to me and I had the opportunity to ask questions. I understand and acknowledge receipt of the instructions indicated above.                                                                                                                                                Physician's or R.N.'s Signature                                                                  Date/Time                                                                                                                                              Patient or Representative Signature                                                          Date/Time

## 2023-08-14 ENCOUNTER — TELEPHONE (OUTPATIENT)
Age: 28
End: 2023-08-14

## 2023-08-14 NOTE — TELEPHONE ENCOUNTER
I called the patient to follow up on the postpartum depression discussion we had in the office when she came in for the 3 day well child visit of her baby. EPDS score was 23. No SI/HI. She was discharged from the hospital on 8/13 and EPDS score was 24. She was prescribed zoloft which she hasn't picked up yet but will do that today. She had had postpartum depression in the past as well. I spoke to Dr Js White regarding this and given the level of concern that she would benefit from being seen in clinic in 1 week to manage this. Patient said she would not like to come in that soon and would like to keep her regular 2 week postpartum visit on 8/25/23. I informed her that I can send in a my chart message with hotline crisis numbers if needed and let her know that if she needs any help that she can always reach out to us as well. Addressed all questions and concerns. She verbalizes understanding.    Yfn Jasso MD  Family Medicine resident

## 2023-08-25 ENCOUNTER — OFFICE VISIT (OUTPATIENT)
Age: 28
End: 2023-08-25
Payer: MEDICAID

## 2023-08-25 VITALS
WEIGHT: 185 LBS | HEART RATE: 63 BPM | TEMPERATURE: 97.9 F | OXYGEN SATURATION: 99 % | DIASTOLIC BLOOD PRESSURE: 57 MMHG | BODY MASS INDEX: 32.77 KG/M2 | SYSTOLIC BLOOD PRESSURE: 92 MMHG

## 2023-08-25 PROCEDURE — 59426 ANTEPARTUM CARE ONLY: CPT | Performed by: FAMILY MEDICINE

## 2023-08-25 RX ORDER — SERTRALINE HYDROCHLORIDE 100 MG/1
100 TABLET, FILM COATED ORAL
Qty: 30 TABLET | Refills: 1 | Status: SHIPPED | OUTPATIENT
Start: 2023-08-25

## 2023-08-25 NOTE — PROGRESS NOTES
[address] [facility] Medicine Office Visit     Assessment/ Plan: Izaiah Shearer is a [address] y.o. female presenting for:    Postpartum Visit - Doing well. -- plan for family planning: likely Copper IUD, info given to schedule  -- EPDS 24, see below     Postpartum Depression, Anxiety - Recurrent. Feels that zoloft is helping but would like to go up to higher dose that was on previously. Has good support from grandparents but on the whole raising three kids as a single parent. Thinks returning to work earlier will help her with stress. Should be starting  soon for her two older kids. -- refilled sertraline at higher dose, will have follow-up in about a month to check on mood  -- will reach out earlier if needed  -- resources previously provided for postpartum support     15 minutes were spent on the day of this encounter both with the patient and in related activities including chart review, care coordination and counseling. Patient instructions were discussed and/or provided in the AVS. The patient understands and agrees to the plan. RETURN TO CARE:   Future Appointments   Date Time Provider [address] [place] [address]   2023 11:00 AM Debra Hernandez, DO [facility] BS AMB         Subjective:  Chief Complaint   Patient presents with    Postpartum Care     EPDS = 24       HPI: Izaiah Shearer is a [address] y.o. [id number] who is 2-weeks postpartum from a . Her pregnancy was complicated by limited social support, anxiety/depression, obesity, anemia, short-interval pregnancy. Labor and delivery uncomplicated.     Anxiety, Depression  History of PPD    - started on zoloft 50, was on 200 in the past   - denies SI/HI    Kids will be in Childcare  - [facility]  - waiting on childcare assistance tae to come through   - grandparents going away -9/15, start back      Lochia: barely bleeding   Pain: okay  Baby: doing well   Sexual activity: no   Family planning: [place] - waiting a little

## 2023-09-21 ENCOUNTER — OFFICE VISIT (OUTPATIENT)
Age: 28
End: 2023-09-21
Payer: MEDICAID

## 2023-09-21 VITALS
OXYGEN SATURATION: 66 % | HEIGHT: 63 IN | RESPIRATION RATE: 18 BRPM | WEIGHT: 188 LBS | BODY MASS INDEX: 33.31 KG/M2 | DIASTOLIC BLOOD PRESSURE: 55 MMHG | HEART RATE: 66 BPM | TEMPERATURE: 98 F | SYSTOLIC BLOOD PRESSURE: 90 MMHG

## 2023-09-21 DIAGNOSIS — F33.1 MODERATE EPISODE OF RECURRENT MAJOR DEPRESSIVE DISORDER (HCC): Primary | ICD-10-CM

## 2023-09-21 PROCEDURE — 99213 OFFICE O/P EST LOW 20 MIN: CPT | Performed by: FAMILY MEDICINE

## 2023-09-21 RX ORDER — SERTRALINE HYDROCHLORIDE 100 MG/1
200 TABLET, FILM COATED ORAL
Qty: 60 TABLET | Refills: 3 | Status: SHIPPED | OUTPATIENT
Start: 2023-09-21

## 2023-09-21 NOTE — PROGRESS NOTES
[address] [facility] Medicine Office Visit     Assessment/ Plan: Andreina Lauren is a 32 y.o. female presenting for:    Postpartum Care  Postpartum Depression - Doing well. -- plan for family planning: IUD  -- EPDS 24 - will increase sertraline to 200mg daily, f/up virtual visit in 2 weeks and will consider adjunctive agent  postpartum depression resources provided  -- will reach out to  given difficulty getting  assistance     15 minutes were spent on the day of this encounter both with the patient and in related activities including chart review, care coordination and counseling. Patient instructions were discussed and/or provided in the AVS. The patient understands and agrees to the plan. RETURN TO CARE: 2 weeks   Future Appointments   Date Time Provider [address]   10/12/2023  8:40 AM Fela So DO [facility] BS AMB       Subjective:  Chief Complaint   Patient presents with    Postpartum Care     Follow up on PPD     HPI: Andreina Lauren is a 32 y.o. [id number] who is 6-weeks postpartum from a . Her pregnancy was complicated by anemia, short-interval pregnancy, obesity, limited social support. Labor and delivery complicated by 3rd degree perineal laceration.       Mood  - panic attack at work  - wondering if another agent to try   - sleeps about 6-7 hours  a little more sleep on nights not working   - grandparents   - denied childcare assistance, approved for [employer] [place] [employer] (maybe late or because shots not UTD)   - went to court Tuesday,  postponed  next date is 11/4     Thursday 10/12 10am    Lochia: stopped  Pain: no issues, bathroom okay   Baby: doing well   Sexual activity: no  Family planning: planning on IUD, not sexually active right now   Mood: see above   Feeding: no breastfeeding   Support from FOB/family: not FOB, grandparents and mother     I have reviewed the patients problem list, current medications, allergies, family,

## 2023-10-12 ENCOUNTER — TELEMEDICINE (OUTPATIENT)
Age: 28
End: 2023-10-12
Payer: MEDICAID

## 2023-10-12 DIAGNOSIS — F33.1 MODERATE EPISODE OF RECURRENT MAJOR DEPRESSIVE DISORDER (HCC): Primary | ICD-10-CM

## 2023-10-12 DIAGNOSIS — F41.9 ANXIETY: ICD-10-CM

## 2023-10-12 PROBLEM — O09.90 SUPERVISION OF HIGH RISK PREGNANCY, ANTEPARTUM: Status: RESOLVED | Noted: 2023-08-04 | Resolved: 2023-10-12

## 2023-10-12 PROBLEM — O99.210 OBESITY COMPLICATING PREGNANCY, UNSPECIFIED TRIMESTER: Status: RESOLVED | Noted: 2023-08-04 | Resolved: 2023-10-12

## 2023-10-12 PROBLEM — Z37.9 NORMAL LABOR: Status: RESOLVED | Noted: 2023-08-11 | Resolved: 2023-10-12

## 2023-10-12 PROCEDURE — 99442 PR PHYS/QHP TELEPHONE EVALUATION 11-20 MIN: CPT | Performed by: FAMILY MEDICINE

## 2023-10-12 RX ORDER — QUETIAPINE FUMARATE 50 MG/1
50 TABLET, EXTENDED RELEASE ORAL NIGHTLY
Qty: 30 TABLET | Refills: 1 | Status: SHIPPED | OUTPATIENT
Start: 2023-10-12

## 2023-10-12 NOTE — PROGRESS NOTES
[address] [facility] Medicine Telephone Visit     Assessment/ Plan: Ofelia Pelletier is a 32 y.o. female presenting for:    Postpartum Mood Check - Stable, having more panic attacks. Work stresses contributing to panic attacks, discussed strategies to help including avoiding triggers, breathing, etc. Will also augment current medication. Discussed bupropion and seroquel, pros/cons of each. Want to avoid activating effect of bupropion so will trial seroquel. Discussed plan to do labs and likely EKG at f/up visit. Reviewed return precautions, reaching out prior to 1 month if struggling. 15 minutes were spent on the day of this encounter both with the patient and in related activities including chart review, care coordination and counseling. Patient instructions were discussed and/or provided in the AVS. The patient understands and agrees to the plan. RETURN TO CARE:   Future Appointments   Date Time Provider [address] [place] [address]   11/17/2023  8:20 AM Marlyu Love, DO [facility] BS AMB         Subjective:  No chief complaint on file. HPI:   Ofelia Pelletier is a 32 y.o. female with PMH of depression here for postpartum depression and anxiety.     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 I have updated them as needed. Review of Systems  See HPI. Objective: There were no vitals taken for this visit. Physical Exam  Gen: did not sound to be in distress    Total Time: minutes: 11-20 minutes    Ofelia Pelletier was evaluated through a synchronous (real-time) audio encounter. Patient identification was verified at the start of the visit.  She (or guardian if

## 2023-11-17 ENCOUNTER — OFFICE VISIT (OUTPATIENT)
Age: 28
End: 2023-11-17
Payer: MEDICAID

## 2023-11-17 VITALS
DIASTOLIC BLOOD PRESSURE: 62 MMHG | SYSTOLIC BLOOD PRESSURE: 94 MMHG | WEIGHT: 198 LBS | RESPIRATION RATE: 18 BRPM | OXYGEN SATURATION: 99 % | BODY MASS INDEX: 35.08 KG/M2 | HEART RATE: 62 BPM | TEMPERATURE: 97.6 F | HEIGHT: 63 IN

## 2023-11-17 DIAGNOSIS — F33.1 MODERATE EPISODE OF RECURRENT MAJOR DEPRESSIVE DISORDER (HCC): Primary | ICD-10-CM

## 2023-11-17 DIAGNOSIS — F41.9 ANXIETY: ICD-10-CM

## 2023-11-17 PROCEDURE — 99213 OFFICE O/P EST LOW 20 MIN: CPT | Performed by: FAMILY MEDICINE

## 2023-11-17 RX ORDER — QUETIAPINE FUMARATE 50 MG/1
50 TABLET, EXTENDED RELEASE ORAL NIGHTLY
Qty: 30 TABLET | Refills: 3 | Status: SHIPPED | OUTPATIENT
Start: 2023-11-17

## 2023-11-17 RX ORDER — SERTRALINE HYDROCHLORIDE 100 MG/1
200 TABLET, FILM COATED ORAL
Qty: 60 TABLET | Refills: 3 | Status: SHIPPED | OUTPATIENT
Start: 2023-11-17

## 2023-11-17 ASSESSMENT — PATIENT HEALTH QUESTIONNAIRE - PHQ9
SUM OF ALL RESPONSES TO PHQ QUESTIONS 1-9: 2
2. FEELING DOWN, DEPRESSED OR HOPELESS: 1
SUM OF ALL RESPONSES TO PHQ QUESTIONS 1-9: 2
1. LITTLE INTEREST OR PLEASURE IN DOING THINGS: 1
SUM OF ALL RESPONSES TO PHQ9 QUESTIONS 1 & 2: 2
SUM OF ALL RESPONSES TO PHQ QUESTIONS 1-9: 2
SUM OF ALL RESPONSES TO PHQ QUESTIONS 1-9: 2
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
## 2024-01-16 DIAGNOSIS — F33.1 MODERATE EPISODE OF RECURRENT MAJOR DEPRESSIVE DISORDER (HCC): ICD-10-CM

## 2024-01-16 DIAGNOSIS — F41.9 ANXIETY: ICD-10-CM

## 2024-01-16 RX ORDER — QUETIAPINE FUMARATE 50 MG/1
100 TABLET, EXTENDED RELEASE ORAL NIGHTLY PRN
Qty: 60 TABLET | Refills: 1 | Status: SHIPPED | OUTPATIENT
Start: 2024-01-16

## 2024-01-31 ENCOUNTER — TELEMEDICINE (OUTPATIENT)
Age: 29
End: 2024-01-31
Payer: MEDICAID

## 2024-01-31 DIAGNOSIS — F33.1 MODERATE EPISODE OF RECURRENT MAJOR DEPRESSIVE DISORDER (HCC): ICD-10-CM

## 2024-01-31 DIAGNOSIS — F41.9 ANXIETY: Primary | ICD-10-CM

## 2024-01-31 PROCEDURE — 99213 OFFICE O/P EST LOW 20 MIN: CPT | Performed by: FAMILY MEDICINE

## 2024-01-31 NOTE — PROGRESS NOTES
[facility] Office Visit     Assessment/ Plan: Leslie Welch is a 28 y.o. female presenting for:    Anxiety and Depression - Persistent, minimal improvement and actually worsened sleep. More related to anxiety at work, limited  options. Minimal improvement with seroquel and has had significant weight gain, will wean. Plan to continue sertraline, going to be establishing with therapist that used to see previously. Updated letter for work provided.     15 minutes were spent on the day of this encounter both with the patient and in related activities including chart review, care coordination and counseling.     I affirm this is a Patient Initiated Episode with an Established Patient who has not had a related appointment within my department in the past 7 days or scheduled within the next 24 hours.  Note: not billable if this call serves to triage the patient into an appointment for the relevant concern    RETURN TO CARE: 1 month  Future Appointments   Date Time Provider Department Center   2/21/2024 10:00 AM Lynne Thomson DO [facility] BS AMB   9/5/2024 10:40 AM Kailyn Garcia PSYD [facility] BS AMB       Subjective:  No chief complaint on file.    Consent: Leslie Welch, who was seen by synchronous (real-time) audio-video technology, and/or her healthcare decision maker, is aware that this patient-initiated, Telehealth encounter on 1/31/2024 is a billable service, with coverage as determined by her insurance carrier. She is aware that she may receive a bill and has provided verbal consent to proceed: yes    HPI: Leslie is a 28 y.o. female with PMH of anxiety, depression here for follow-up mood.     April - can qualify for FMLA paperwork   Letter was not specific enough so hasn't helped with extended break times   Supposed to get 30 minutes for lunch and 2 15-minute breaks (total 1 hour)  currently 45-minute lunch  Doesn't feel like at risk for losing job  bad

## 2024-02-21 ENCOUNTER — TELEMEDICINE (OUTPATIENT)
Age: 29
End: 2024-02-21
Payer: MEDICAID

## 2024-02-21 DIAGNOSIS — F41.9 ANXIETY: ICD-10-CM

## 2024-02-21 DIAGNOSIS — F33.1 MODERATE EPISODE OF RECURRENT MAJOR DEPRESSIVE DISORDER (HCC): Primary | ICD-10-CM

## 2024-02-21 PROCEDURE — 99442 PR PHYS/QHP TELEPHONE EVALUATION 11-20 MIN: CPT | Performed by: FAMILY MEDICINE

## 2024-02-21 RX ORDER — ESCITALOPRAM OXALATE 10 MG/1
10 TABLET ORAL DAILY
Qty: 30 TABLET | Refills: 3 | Status: SHIPPED | OUTPATIENT
Start: 2024-02-21

## 2024-02-21 NOTE — PROGRESS NOTES
[facility] Office Visit     Assessment/ Plan: Leslie Welch is a 28 y.o. female presenting for:    Anxiety and Depression - Persistent, minimal improvement. Accidentally stopped both medications and significant side effects. Feels like sertraline not helping, would like to try something else. Discussed Lexapro and amenable. Will switch directly, follow-up in 1-2 months. I will also be reaching out to HR at work because letter still too \"generic\" and not yet eligible for FMLA.     15 minutes were spent on the day of this encounter both with the patient and in related activities including chart review, care coordination and counseling.     I affirm this is a Patient Initiated Episode with an Established Patient who has not had a related appointment within my department in the past 7 days or scheduled within the next 24 hours.  Note: not billable if this call serves to triage the patient into an appointment for the relevant concern    RETURN TO CARE: 2 months   Future Appointments   Date Time Provider Department Center   9/5/2024 10:40 AM Kailyn Garcia PSYD [facility] BS AMB     Subjective:  No chief complaint on file.    Consent: Leslie Welch, who was seen by synchronous (real-time) audio only technology, and/or her healthcare decision maker, is aware that this patient-initiated, Telehealth encounter on 2/21/2024 is a billable service, with coverage as determined by her insurance carrier. She is aware that she may receive a bill and has provided verbal consent to proceed: yes.    HPI: Leslie Welch is a 28 y.o. female with PMH of anxiety/depression here for follow-up.     - change postpartum to regular - anxiety/depression   - weaning off seroquel - dizzy/lightheaded  accidentally stopped both  - 100mg zoloft/50mg seroquel - felt fine  was too sleepy now sleeping fine   - wants to switch medications - aunt mentioned   - mix of both depression/anxiety   - form from

## 2024-03-13 ENCOUNTER — OFFICE VISIT (OUTPATIENT)
Age: 29
End: 2024-03-13
Payer: MEDICAID

## 2024-03-13 VITALS
BODY MASS INDEX: 38.97 KG/M2 | DIASTOLIC BLOOD PRESSURE: 66 MMHG | TEMPERATURE: 97.6 F | SYSTOLIC BLOOD PRESSURE: 110 MMHG | OXYGEN SATURATION: 98 % | WEIGHT: 220 LBS | HEART RATE: 84 BPM

## 2024-03-13 DIAGNOSIS — J06.9 VIRAL URI: Primary | ICD-10-CM

## 2024-03-13 PROCEDURE — 99213 OFFICE O/P EST LOW 20 MIN: CPT | Performed by: STUDENT IN AN ORGANIZED HEALTH CARE EDUCATION/TRAINING PROGRAM

## 2024-03-13 ASSESSMENT — ENCOUNTER SYMPTOMS
SORE THROAT: 0
SHORTNESS OF BREATH: 0
VOMITING: 0
COUGH: 1
DIARRHEA: 0

## 2024-03-13 NOTE — PROGRESS NOTES
Identified pt with two pt identifiers(name and ). Reviewed record in preparation for visit and have obtained necessary documentation.  Chief Complaint   Patient presents with    Cough     Cough started yesterday.     Dizziness        Vitals:    24 1738   BP: 110/66   Pulse: 84   Temp: 97.6 °F (36.4 °C)   TempSrc: Oral   SpO2: 98%   Weight: 99.8 kg (220 lb)         Coordination of Care Questionnaire:  :     \"Have you been to the ER, urgent care clinic since your last visit?  Hospitalized since your last visit?\"    NO    “Have you seen or consulted any other health care providers outside of [facility] since your last visit?”    NO              
Mouth/Throat:      Mouth: Mucous membranes are moist.      Pharynx: Posterior oropharyngeal erythema present. No oropharyngeal exudate.   Eyes:      General:         Right eye: No discharge.         Left eye: No discharge.      Conjunctiva/sclera: Conjunctivae normal.   Cardiovascular:      Rate and Rhythm: Normal rate and regular rhythm.      Pulses: Normal pulses.      Heart sounds: Normal heart sounds.   Pulmonary:      Effort: Pulmonary effort is normal. No respiratory distress.      Breath sounds: Normal breath sounds. No wheezing.   Neurological:      Mental Status: She is alert.        Assessment and Plan   Leslie Welch is a 28 y.o. female who presents for Cough (Cough started yesterday. ) and Dizziness      1. Viral URI  Given history elicited from patient, this is most consistent with a viral infection.  Counseled patient that viral illnesses usually last 7-10 days, and treatment consists of supportive care.  Discussed a number of OTC options available, with maximum dosing and contraindications discussed.  -Tylenol and Advil as needed  -Maintain adequate hydration and get good sleep  -Follow-up if no improvement or worsening of symptoms after 10 days        Return in about 1 week (around 3/20/2024), or if symptoms worsen or fail to improve.    Pt was discussed with Dr. Headley (attending physician).    I have reviewed patient medical and social history and medications.  I have reviewed pertinent labs results and other data. I have discussed the diagnosis with the patient and the intended plan as seen in the above orders. The patient has received an after-visit summary and questions were answered concerning future plans. I have discussed medication side effects and warnings with the patient as well.    Dakota Abarca MD  Resident, [facility]  03/13/24

## 2024-03-21 ENCOUNTER — TELEMEDICINE (OUTPATIENT)
Age: 29
End: 2024-03-21
Payer: MEDICAID

## 2024-03-21 DIAGNOSIS — F33.1 MODERATE EPISODE OF RECURRENT MAJOR DEPRESSIVE DISORDER (HCC): Primary | ICD-10-CM

## 2024-03-21 DIAGNOSIS — F41.9 ANXIETY: ICD-10-CM

## 2024-03-21 PROCEDURE — 99213 OFFICE O/P EST LOW 20 MIN: CPT | Performed by: FAMILY MEDICINE

## 2024-03-21 NOTE — PROGRESS NOTES
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 I have updated them as needed.     Review of Systems  See HPI.    Objective:  There were no vitals taken for this visit.  Physical Exam  Constitutional:       General: She is not in acute distress.  Pulmonary:      Effort: No respiratory distress.   Psychiatric:         Mood and Affect: Mood normal.         Thought Content: Thought content normal.       Lynne Thomson DO, [facility]

## 2024-03-25 RX ORDER — ESCITALOPRAM OXALATE 20 MG/1
10 TABLET ORAL DAILY
Qty: 30 TABLET | Refills: 3 | Status: SHIPPED | OUTPATIENT
Start: 2024-03-25

## 2024-04-12 ENCOUNTER — OFFICE VISIT (OUTPATIENT)
Age: 29
End: 2024-04-12
Payer: MEDICAID

## 2024-04-12 VITALS
RESPIRATION RATE: 18 BRPM | HEIGHT: 63 IN | SYSTOLIC BLOOD PRESSURE: 131 MMHG | HEART RATE: 86 BPM | WEIGHT: 218.8 LBS | DIASTOLIC BLOOD PRESSURE: 82 MMHG | BODY MASS INDEX: 38.77 KG/M2 | TEMPERATURE: 98.5 F | OXYGEN SATURATION: 96 %

## 2024-04-12 DIAGNOSIS — F33.1 MODERATE EPISODE OF RECURRENT MAJOR DEPRESSIVE DISORDER (HCC): Primary | ICD-10-CM

## 2024-04-12 DIAGNOSIS — F41.9 ANXIETY: ICD-10-CM

## 2024-04-12 DIAGNOSIS — E66.9 OBESITY, UNSPECIFIED CLASSIFICATION, UNSPECIFIED OBESITY TYPE, UNSPECIFIED WHETHER SERIOUS COMORBIDITY PRESENT: ICD-10-CM

## 2024-04-12 DIAGNOSIS — E55.9 VITAMIN D DEFICIENCY: ICD-10-CM

## 2024-04-12 DIAGNOSIS — R07.9 CHEST PAIN, UNSPECIFIED TYPE: ICD-10-CM

## 2024-04-12 DIAGNOSIS — R76.8 THYROGLOBULIN ANTIBODY POSITIVE: ICD-10-CM

## 2024-04-12 PROCEDURE — 99214 OFFICE O/P EST MOD 30 MIN: CPT | Performed by: FAMILY MEDICINE

## 2024-04-12 PROCEDURE — 93005 ELECTROCARDIOGRAM TRACING: CPT | Performed by: FAMILY MEDICINE

## 2024-04-12 ASSESSMENT — PATIENT HEALTH QUESTIONNAIRE - PHQ9
SUM OF ALL RESPONSES TO PHQ QUESTIONS 1-9: 10
SUM OF ALL RESPONSES TO PHQ QUESTIONS 1-9: 2
4. FEELING TIRED OR HAVING LITTLE ENERGY: MORE THAN HALF THE DAYS
2. FEELING DOWN, DEPRESSED OR HOPELESS: SEVERAL DAYS
SUM OF ALL RESPONSES TO PHQ9 QUESTIONS 1 & 2: 2
SUM OF ALL RESPONSES TO PHQ QUESTIONS 1-9: 2
SUM OF ALL RESPONSES TO PHQ QUESTIONS 1-9: 10
3. TROUBLE FALLING OR STAYING ASLEEP: SEVERAL DAYS
SUM OF ALL RESPONSES TO PHQ QUESTIONS 1-9: 10
8. MOVING OR SPEAKING SO SLOWLY THAT OTHER PEOPLE COULD HAVE NOTICED. OR THE OPPOSITE, BEING SO FIGETY OR RESTLESS THAT YOU HAVE BEEN MOVING AROUND A LOT MORE THAN USUAL: NOT AT ALL
SUM OF ALL RESPONSES TO PHQ QUESTIONS 1-9: 10
2. FEELING DOWN, DEPRESSED OR HOPELESS: SEVERAL DAYS
SUM OF ALL RESPONSES TO PHQ QUESTIONS 1-9: 2
6. FEELING BAD ABOUT YOURSELF - OR THAT YOU ARE A FAILURE OR HAVE LET YOURSELF OR YOUR FAMILY DOWN: SEVERAL DAYS
7. TROUBLE CONCENTRATING ON THINGS, SUCH AS READING THE NEWSPAPER OR WATCHING TELEVISION: NEARLY EVERY DAY
9. THOUGHTS THAT YOU WOULD BE BETTER OFF DEAD, OR OF HURTING YOURSELF: NOT AT ALL
10. IF YOU CHECKED OFF ANY PROBLEMS, HOW DIFFICULT HAVE THESE PROBLEMS MADE IT FOR YOU TO DO YOUR WORK, TAKE CARE OF THINGS AT HOME, OR GET ALONG WITH OTHER PEOPLE: SOMEWHAT DIFFICULT
SUM OF ALL RESPONSES TO PHQ9 QUESTIONS 1 & 2: 2
5. POOR APPETITE OR OVEREATING: SEVERAL DAYS
SUM OF ALL RESPONSES TO PHQ QUESTIONS 1-9: 2
1. LITTLE INTEREST OR PLEASURE IN DOING THINGS: SEVERAL DAYS
1. LITTLE INTEREST OR PLEASURE IN DOING THINGS: SEVERAL DAYS

## 2024-04-12 ASSESSMENT — ANXIETY QUESTIONNAIRES
7. FEELING AFRAID AS IF SOMETHING AWFUL MIGHT HAPPEN: SEVERAL DAYS
1. FEELING NERVOUS, ANXIOUS, OR ON EDGE: SEVERAL DAYS
3. WORRYING TOO MUCH ABOUT DIFFERENT THINGS: SEVERAL DAYS
5. BEING SO RESTLESS THAT IT IS HARD TO SIT STILL: SEVERAL DAYS
IF YOU CHECKED OFF ANY PROBLEMS ON THIS QUESTIONNAIRE, HOW DIFFICULT HAVE THESE PROBLEMS MADE IT FOR YOU TO DO YOUR WORK, TAKE CARE OF THINGS AT HOME, OR GET ALONG WITH OTHER PEOPLE: SOMEWHAT DIFFICULT
6. BECOMING EASILY ANNOYED OR IRRITABLE: MORE THAN HALF THE DAYS
4. TROUBLE RELAXING: SEVERAL DAYS

## 2024-04-12 NOTE — PROGRESS NOTES
Chief Complaint   Patient presents with    Anxiety     Pt is here to discuss her mood changes.  Also wants to discuss possible abx wisdom tooth pain, she has an appt w/ dentist next Friday.           Identified pt with two pt identifiers(name and ). Reviewed record in preparation for visit and have obtained necessary documentation.  Chief Complaint   Patient presents with    Anxiety     Pt is here to discuss her mood changes.  Also wants to discuss possible abx wisdom tooth pain, she has an appt w/ dentist next Friday.        Vitals:    24 1055   BP: 131/82   Site: Left Upper Arm   Position: Sitting   Cuff Size: Large Adult   Pulse: 86   Resp: 18   Temp: 98.5 °F (36.9 °C)   TempSrc: Oral   SpO2: 96%   Weight: 99.2 kg (218 lb 12.8 oz)   Height: 1.6 m (5' 3\")         Coordination of Care Questionnaire:  :     \"Have you been to the ER, urgent care clinic since your last visit?  Hospitalized since your last visit?\"    NO    “Have you seen or consulted any other health care providers outside of [facility] since your last visit?”    NO            Click Here for Release of Records Request   
longer able ot help in June  [place] [employer] - get him off - Kassi call     Tachycardia  - smart watch - hasn't started  - chest pain on left side  - whole entire chest then to back   - hurts all the time   - gets worse with doing activity     [facility]  - appt next week     I have reviewed the patients problem list, current medications, allergies, family, medical and social history. I have updated them as needed.     Review of Systems  See HPI.    Objective:  /82 (Site: Left Upper Arm, Position: Sitting, Cuff Size: Large Adult)   Pulse 86   Temp 98.5 °F (36.9 °C) (Oral)   Resp 18   Ht 1.6 m (5' 3\")   Wt 99.2 kg (218 lb 12.8 oz)   SpO2 96%   BMI 38.76 kg/m²   Physical Exam  Vitals and nursing note reviewed.   Constitutional:       General: She is not in acute distress.     Appearance: Normal appearance.   HENT:      Head: Normocephalic and atraumatic.   Eyes:      Extraocular Movements: Extraocular movements intact.      Conjunctiva/sclera: Conjunctivae normal.   Neck:      Comments: No thyromegaly  Cardiovascular:      Rate and Rhythm: Normal rate and regular rhythm.      Pulses: Normal pulses.   Pulmonary:      Effort: Pulmonary effort is normal. No respiratory distress.      Breath sounds: Normal breath sounds.      Comments: Costochondral TTP L>R  Musculoskeletal:      Cervical back: Neck supple.      Right lower leg: No edema.      Left lower leg: No edema.   Lymphadenopathy:      Cervical: No cervical adenopathy.   Skin:     General: Skin is warm and dry.   Neurological:      General: No focal deficit present.      Mental Status: She is alert.   Psychiatric:         Mood and Affect: Mood normal.         Behavior: Behavior normal.         Thought Content: Thought content normal.       Lynne Thomson DO, [facility]

## 2024-04-13 LAB
25(OH)D3 SERPL-MCNC: 10.3 NG/ML (ref 30–100)
ALBUMIN SERPL-MCNC: 3.9 G/DL (ref 3.5–5)
ALBUMIN/GLOB SERPL: 1.4 (ref 1.1–2.2)
ALP SERPL-CCNC: 108 U/L (ref 45–117)
ALT SERPL-CCNC: 26 U/L (ref 12–78)
ANION GAP SERPL CALC-SCNC: 7 MMOL/L (ref 5–15)
AST SERPL-CCNC: 16 U/L (ref 15–37)
BASOPHILS # BLD: 0.1 K/UL (ref 0–0.1)
BASOPHILS NFR BLD: 1 % (ref 0–1)
BILIRUB SERPL-MCNC: 0.9 MG/DL (ref 0.2–1)
BUN SERPL-MCNC: 11 MG/DL (ref 6–20)
BUN/CREAT SERPL: 13 (ref 12–20)
CALCIUM SERPL-MCNC: 9.5 MG/DL (ref 8.5–10.1)
CHLORIDE SERPL-SCNC: 108 MMOL/L (ref 97–108)
CHOLEST SERPL-MCNC: 160 MG/DL
CO2 SERPL-SCNC: 28 MMOL/L (ref 21–32)
CREAT SERPL-MCNC: 0.87 MG/DL (ref 0.55–1.02)
D DIMER PPP FEU-MCNC: 0.21 MG/L FEU (ref 0–0.65)
DIFFERENTIAL METHOD BLD: NORMAL
EOSINOPHIL # BLD: 0.1 K/UL (ref 0–0.4)
EOSINOPHIL NFR BLD: 1 % (ref 0–7)
ERYTHROCYTE [DISTWIDTH] IN BLOOD BY AUTOMATED COUNT: 12.8 % (ref 11.5–14.5)
EST. AVERAGE GLUCOSE BLD GHB EST-MCNC: 82 MG/DL
GLOBULIN SER CALC-MCNC: 2.7 G/DL (ref 2–4)
GLUCOSE SERPL-MCNC: 102 MG/DL (ref 65–100)
HBA1C MFR BLD: 4.5 % (ref 4–5.6)
HCT VFR BLD AUTO: 39.7 % (ref 35–47)
HDLC SERPL-MCNC: 44 MG/DL
HDLC SERPL: 3.6 (ref 0–5)
HGB BLD-MCNC: 13.3 G/DL (ref 11.5–16)
IMM GRANULOCYTES # BLD AUTO: 0 K/UL (ref 0–0.04)
IMM GRANULOCYTES NFR BLD AUTO: 0 % (ref 0–0.5)
LDLC SERPL CALC-MCNC: 101.8 MG/DL (ref 0–100)
LYMPHOCYTES # BLD: 2.3 K/UL (ref 0.8–3.5)
LYMPHOCYTES NFR BLD: 22 % (ref 12–49)
MCH RBC QN AUTO: 30.3 PG (ref 26–34)
MCHC RBC AUTO-ENTMCNC: 33.5 G/DL (ref 30–36.5)
MCV RBC AUTO: 90.4 FL (ref 80–99)
MONOCYTES # BLD: 0.6 K/UL (ref 0–1)
MONOCYTES NFR BLD: 6 % (ref 5–13)
NEUTS SEG # BLD: 7.1 K/UL (ref 1.8–8)
NEUTS SEG NFR BLD: 70 % (ref 32–75)
NRBC # BLD: 0 K/UL (ref 0–0.01)
NRBC BLD-RTO: 0 PER 100 WBC
PLATELET # BLD AUTO: 220 K/UL (ref 150–400)
PMV BLD AUTO: 10.4 FL (ref 8.9–12.9)
POTASSIUM SERPL-SCNC: 4.5 MMOL/L (ref 3.5–5.1)
PROT SERPL-MCNC: 6.6 G/DL (ref 6.4–8.2)
RBC # BLD AUTO: 4.39 M/UL (ref 3.8–5.2)
SODIUM SERPL-SCNC: 143 MMOL/L (ref 136–145)
T4 FREE SERPL-MCNC: 1 NG/DL (ref 0.8–1.5)
TRIGL SERPL-MCNC: 71 MG/DL
TSH SERPL DL<=0.05 MIU/L-ACNC: 1.13 UIU/ML (ref 0.36–3.74)
VLDLC SERPL CALC-MCNC: 14.2 MG/DL
WBC # BLD AUTO: 10.1 K/UL (ref 3.6–11)

## 2024-04-14 LAB
THYROGLOB AB SERPL-ACNC: 3.8 IU/ML (ref 0–0.9)
THYROPEROXIDASE AB SERPL-ACNC: <9 IU/ML (ref 0–34)

## 2024-04-15 RX ORDER — ERGOCALCIFEROL 1.25 MG/1
50000 CAPSULE ORAL WEEKLY
Qty: 12 CAPSULE | Refills: 0 | Status: SHIPPED | OUTPATIENT
Start: 2024-04-15

## 2024-04-16 ENCOUNTER — TELEPHONE (OUTPATIENT)
Age: 29
End: 2024-04-16

## 2024-04-16 NOTE — TELEPHONE ENCOUNTER
SW referral received from medical provider. Connected with the patient via phone today.  Introduced self and role, and offered support.  Patient identified  need as primary concern today.      Patient reports not having enough time to speak as she was getting kids ready and heading to work. SW advised patient that  resource information will be mailed to the address on file. Patient advised to contact clinic and set up an appointment with SW should she need additional assistance.     PEREZ Moore Navigator

## 2024-05-10 ENCOUNTER — TELEPHONE (OUTPATIENT)
Age: 29
End: 2024-05-10

## 2024-05-10 NOTE — TELEPHONE ENCOUNTER
SW attempted to reach patient regarding prior need with childcare. No answer; left voicemail advising patient to contact me directly or schedule an appointment with SW by calling clinic general line.     Kassi Ambrose [facility]   Navigator

## 2024-05-13 ENCOUNTER — OFFICE VISIT (OUTPATIENT)
Age: 29
End: 2024-05-13

## 2024-05-13 ENCOUNTER — TELEMEDICINE (OUTPATIENT)
Age: 29
End: 2024-05-13

## 2024-05-13 DIAGNOSIS — Z78.9 NEED FOR FOLLOW-UP BY SOCIAL WORKER: Primary | ICD-10-CM

## 2024-05-13 DIAGNOSIS — R76.8 THYROGLOBULIN ANTIBODY POSITIVE: ICD-10-CM

## 2024-05-13 DIAGNOSIS — E55.9 VITAMIN D DEFICIENCY: ICD-10-CM

## 2024-05-13 DIAGNOSIS — F41.9 ANXIETY: Primary | ICD-10-CM

## 2024-05-13 DIAGNOSIS — F33.1 MODERATE EPISODE OF RECURRENT MAJOR DEPRESSIVE DISORDER (HCC): ICD-10-CM

## 2024-05-13 RX ORDER — ERGOCALCIFEROL 1.25 MG/1
50000 CAPSULE ORAL WEEKLY
Qty: 12 CAPSULE | Refills: 0 | Status: SHIPPED | OUTPATIENT
Start: 2024-05-13

## 2024-05-13 NOTE — PROGRESS NOTES
Patient visit with [employer] Navigator for  assistance.    Patient previously having difficulty with obtaining assistance via  [employer] as spouse was still listed on her Medicaid case although they've been  for some time. Patient previously advised that DSS could remove him from case as he is no longer in home.    Today patient advised that spouse has been removed from case. Furthermore, child support enforcement has been requested with [employer]; no child support payment as of yet but awaiting outcome.     assistance application completed a few days ago by patient with [employer]. Per patient has not heard back yet. SW advised patient that it can take up to 30 days for processing. Advised to contact [employer] should she not hear back within the time frame specified. Patient has located two  facilities that are close to her job. Patient informed as long as the site is an approved subsidy vendor she will be able to utilize them.     Patient is currently receiving WIC services. SW recommended patient apply for SNAP benefits as spouse's income is no longer in question with DSS. Patient agreed and states will apply.     No additional needs at present time. Advised to contact clinic SW should she need assistance in future.    Plan:  Ongoing psychosocial support and resource referral, as desired by the patient and family.      PEREZ Moore   Navigator

## 2024-05-13 NOTE — PROGRESS NOTES
[facility] Office Visit     Assessment/ Plan: Leslie Welch is a 28 y.o. female presenting for:    Depression and Anxiety  Fatigue, Obesity - Chronic, waxing/waning course based on stressors. Has weaned off of medications completely, feels like doing okay.   -- completed FMLA paperwork today  -- meeting with CM/SW today     Low Vitamin D - Continue high-dose vitamin D supplementation.     Elevated Thyroglobulin Antibodies - Normal TSH and free T4 but is symptomatic. Will refer to endocrine for further evaluation.     7 minutes were spent on the day of this encounter both with the patient and in related activities including chart review, care coordination and counseling.     I affirm this is a Patient Initiated Episode with an Established Patient who has not had a related appointment within my department in the past 7 days or scheduled within the next 24 hours.  Note: not billable if this call serves to triage the patient into an appointment for the relevant concern    RETURN TO CARE: 1-3 months prn  Future Appointments   Date Time Provider Department Center   5/13/2024 10:00 AM Kassi Ambrose BS AMB   9/5/2024 10:40 AM Kailyn Garcia PSYD [facility] BS AMB       Subjective:  No chief complaint on file.      Consent: Leslie Welch, who was seen by synchronous (real-time) audio and visual (INSOM[employer]IA) technology, and/or her healthcare decision maker, is aware that this patient-initiated, Telehealth encounter on 5/13/2024 is a billable service, with coverage as determined by her insurance carrier. She is aware that she may receive a bill and has provided verbal consent to proceed: yes.    HPI: Leslie Welch is a 28 y.o. female with PMH of anxiety and depression here for mood follow-up.     - has FMLA paperwork with her to drop off   - feels like doing okay  - actually here today to meet with SW and have check-ups for children     I have reviewed the patients problem

## 2024-08-09 ENCOUNTER — OFFICE VISIT (OUTPATIENT)
Age: 29
End: 2024-08-09
Payer: MEDICAID

## 2024-08-09 VITALS
SYSTOLIC BLOOD PRESSURE: 112 MMHG | DIASTOLIC BLOOD PRESSURE: 72 MMHG | HEIGHT: 63 IN | BODY MASS INDEX: 38.45 KG/M2 | OXYGEN SATURATION: 98 % | TEMPERATURE: 98.6 F | WEIGHT: 217 LBS | RESPIRATION RATE: 18 BRPM | HEART RATE: 73 BPM

## 2024-08-09 DIAGNOSIS — R76.8 THYROID ANTIBODY POSITIVE: ICD-10-CM

## 2024-08-09 DIAGNOSIS — E55.9 VITAMIN D DEFICIENCY: Primary | ICD-10-CM

## 2024-08-09 PROCEDURE — 99213 OFFICE O/P EST LOW 20 MIN: CPT | Performed by: FAMILY MEDICINE

## 2024-08-09 RX ORDER — ERGOCALCIFEROL 1.25 MG/1
50000 CAPSULE ORAL WEEKLY
Qty: 12 CAPSULE | Refills: 1 | Status: SHIPPED | OUTPATIENT
Start: 2024-08-09

## 2024-08-09 SDOH — ECONOMIC STABILITY: FOOD INSECURITY: WITHIN THE PAST 12 MONTHS, YOU WORRIED THAT YOUR FOOD WOULD RUN OUT BEFORE YOU GOT MONEY TO BUY MORE.: SOMETIMES TRUE

## 2024-08-09 SDOH — ECONOMIC STABILITY: INCOME INSECURITY: HOW HARD IS IT FOR YOU TO PAY FOR THE VERY BASICS LIKE FOOD, HOUSING, MEDICAL CARE, AND HEATING?: VERY HARD

## 2024-08-09 SDOH — ECONOMIC STABILITY: FOOD INSECURITY: WITHIN THE PAST 12 MONTHS, THE FOOD YOU BOUGHT JUST DIDN'T LAST AND YOU DIDN'T HAVE MONEY TO GET MORE.: SOMETIMES TRUE

## 2024-08-09 ASSESSMENT — PATIENT HEALTH QUESTIONNAIRE - PHQ9
4. FEELING TIRED OR HAVING LITTLE ENERGY: NEARLY EVERY DAY
SUM OF ALL RESPONSES TO PHQ QUESTIONS 1-9: 19
SUM OF ALL RESPONSES TO PHQ QUESTIONS 1-9: 19
9. THOUGHTS THAT YOU WOULD BE BETTER OFF DEAD, OR OF HURTING YOURSELF: NOT AT ALL
8. MOVING OR SPEAKING SO SLOWLY THAT OTHER PEOPLE COULD HAVE NOTICED. OR THE OPPOSITE, BEING SO FIGETY OR RESTLESS THAT YOU HAVE BEEN MOVING AROUND A LOT MORE THAN USUAL: NEARLY EVERY DAY
1. LITTLE INTEREST OR PLEASURE IN DOING THINGS: MORE THAN HALF THE DAYS
10. IF YOU CHECKED OFF ANY PROBLEMS, HOW DIFFICULT HAVE THESE PROBLEMS MADE IT FOR YOU TO DO YOUR WORK, TAKE CARE OF THINGS AT HOME, OR GET ALONG WITH OTHER PEOPLE: EXTREMELY DIFFICULT
SUM OF ALL RESPONSES TO PHQ QUESTIONS 1-9: 19
3. TROUBLE FALLING OR STAYING ASLEEP: MORE THAN HALF THE DAYS
SUM OF ALL RESPONSES TO PHQ9 QUESTIONS 1 & 2: 4
6. FEELING BAD ABOUT YOURSELF - OR THAT YOU ARE A FAILURE OR HAVE LET YOURSELF OR YOUR FAMILY DOWN: SEVERAL DAYS
2. FEELING DOWN, DEPRESSED OR HOPELESS: MORE THAN HALF THE DAYS
7. TROUBLE CONCENTRATING ON THINGS, SUCH AS READING THE NEWSPAPER OR WATCHING TELEVISION: NEARLY EVERY DAY
SUM OF ALL RESPONSES TO PHQ QUESTIONS 1-9: 19
5. POOR APPETITE OR OVEREATING: NEARLY EVERY DAY

## 2024-08-09 NOTE — PROGRESS NOTES
Leslie Welch is a 28 y.o. female      Chief Complaint   Patient presents with    Mood Swings     Patient is coming in for a follow up on mood and vitamin d refill. No other concerns.        \"Have you been to the ER, urgent care clinic since your last visit?  Hospitalized since your last visit?\"    NO    “Have you seen or consulted any other health care providers outside of [facility] since your last visit?”    NO        “Have you had a pap smear?”    NO    Date of last Cervical Cancer screen (HPV or PAP): 5/20/2020            Vitals:    08/09/24 1531   BP: 112/72   Site: Right Upper Arm   Position: Sitting   Pulse: 73   Resp: 18   Temp: 98.6 °F (37 °C)   TempSrc: Oral   SpO2: 98%   Weight: 98.4 kg (217 lb)   Height: 1.6 m (5' 3\")            Health Maintenance Due   Topic Date Due    Hepatitis B vaccine (1 of 3 - 3-dose series) Never done    COVID-19 Vaccine (1) Never done    Varicella vaccine (1 of 2 - 2-dose childhood series) Never done    Pap smear  05/20/2023    Flu vaccine (1) Never done         Medication Reconciliation completed, changes noted.  Please  Update medication list.    
reviewed.   Constitutional:       General: She is not in acute distress.     Appearance: Normal appearance.   HENT:      Head: Normocephalic and atraumatic.      Nose: Nose normal.   Eyes:      Extraocular Movements: Extraocular movements intact.      Conjunctiva/sclera: Conjunctivae normal.   Cardiovascular:      Rate and Rhythm: Normal rate.   Pulmonary:      Effort: No respiratory distress.   Neurological:      General: No focal deficit present.      Mental Status: She is alert.   Psychiatric:         Mood and Affect: Mood normal.         Thought Content: Thought content normal.       Lynne Thomson DO, [facility]

## 2024-08-09 NOTE — PATIENT INSTRUCTIONS
[email or web address]       [facility] [employer] [facility]   What they offer: free coaching services in the areas of Employment, Financial, and Benefits Access   Phone Number: [phone]   Email: [email or web address]   Address: [address] [place] [address]

## 2024-08-10 LAB
25(OH)D3 SERPL-MCNC: 27.3 NG/ML (ref 30–100)
TSH SERPL DL<=0.05 MIU/L-ACNC: 0.98 UIU/ML (ref 0.36–3.74)

## 2024-08-12 LAB
THYROGLOB AB SERPL-ACNC: <1 IU/ML (ref 0–0.9)
THYROPEROXIDASE AB SERPL-ACNC: <9 IU/ML (ref 0–34)

## 2024-09-05 ENCOUNTER — OFFICE VISIT (OUTPATIENT)
Age: 29
End: 2024-09-05
Payer: MEDICAID

## 2024-09-05 ENCOUNTER — OFFICE VISIT (OUTPATIENT)
Age: 29
End: 2024-09-05

## 2024-09-05 VITALS
BODY MASS INDEX: 38.62 KG/M2 | HEART RATE: 70 BPM | TEMPERATURE: 98.1 F | RESPIRATION RATE: 18 BRPM | HEIGHT: 63 IN | DIASTOLIC BLOOD PRESSURE: 67 MMHG | SYSTOLIC BLOOD PRESSURE: 106 MMHG | OXYGEN SATURATION: 99 % | WEIGHT: 218 LBS

## 2024-09-05 DIAGNOSIS — F32.A ANXIETY AND DEPRESSION: Primary | ICD-10-CM

## 2024-09-05 DIAGNOSIS — Z12.4 SCREENING FOR CERVICAL CANCER: Primary | ICD-10-CM

## 2024-09-05 DIAGNOSIS — R41.840 ATTENTION DEFICIT: ICD-10-CM

## 2024-09-05 DIAGNOSIS — F33.1 MODERATE EPISODE OF RECURRENT MAJOR DEPRESSIVE DISORDER (HCC): ICD-10-CM

## 2024-09-05 DIAGNOSIS — Z65.8 INSUFFICIENT SOCIAL SUPPORT: ICD-10-CM

## 2024-09-05 DIAGNOSIS — F41.9 ANXIETY AND DEPRESSION: Primary | ICD-10-CM

## 2024-09-05 PROCEDURE — 99213 OFFICE O/P EST LOW 20 MIN: CPT | Performed by: FAMILY MEDICINE

## 2024-09-05 NOTE — PROGRESS NOTES
current facility-administered medications for this visit.       Diagnostic Impressions/Rule out Diagnoses From Today's Visit:      Anxiety and Depression  ADHD    Plan:  Obtain authorization for testing from insurance company.  Report to follow once testing, scoring, and interpretation completed.  ? Organic based neurocognitive issues versus mood disorder or combination of same.  ? Problems organic, functional, or both? The patient has not gotten better from any treatment to date.  Treatment decisions cannot be appropriately made without testing.  The patient is not abusing drugs.  There is a suspected brain problem, which can be identified, quantified, and hopefully addressed via neurocognitive and psychological testing.      This note will not be viewable in [employer].

## 2024-09-07 LAB
., LABCORP: NORMAL
CYTOLOGIST CVX/VAG CYTO: NORMAL
CYTOLOGY CVX/VAG DOC CYTO: NORMAL
CYTOLOGY CVX/VAG DOC THIN PREP: NORMAL
DX ICD CODE: NORMAL
Lab: NORMAL
OTHER STN SPEC: NORMAL
STAT OF ADQ CVX/VAG CYTO-IMP: NORMAL

## 2024-09-11 ENCOUNTER — TELEPHONE (OUTPATIENT)
Age: 29
End: 2024-09-11

## 2024-09-16 ENCOUNTER — TELEPHONE (OUTPATIENT)
Age: 29
End: 2024-09-16

## 2024-09-18 ENCOUNTER — OFFICE VISIT (OUTPATIENT)
Age: 29
End: 2024-09-18

## 2024-09-18 ENCOUNTER — PROCEDURE VISIT (OUTPATIENT)
Age: 29
End: 2024-09-18
Payer: MEDICAID

## 2024-09-18 DIAGNOSIS — F60.3 BORDERLINE PERSONALITY DISORDER IN ADULT (HCC): ICD-10-CM

## 2024-09-18 DIAGNOSIS — F43.10 PTSD (POST-TRAUMATIC STRESS DISORDER): ICD-10-CM

## 2024-09-18 DIAGNOSIS — F32.1 MODERATE MAJOR DEPRESSION (HCC): Primary | ICD-10-CM

## 2024-09-18 DIAGNOSIS — F90.0 ATTENTION DEFICIT HYPERACTIVITY DISORDER (ADHD), INATTENTIVE TYPE, MODERATE: ICD-10-CM

## 2024-09-18 DIAGNOSIS — Z78.9 NEED FOR FOLLOW-UP BY SOCIAL WORKER: Primary | ICD-10-CM

## 2024-09-18 DIAGNOSIS — F41.9 MODERATE ANXIETY: ICD-10-CM

## 2024-09-18 PROCEDURE — 96139 PSYCL/NRPSYC TST TECH EA: CPT | Performed by: CLINICAL NEUROPSYCHOLOGIST

## 2024-09-18 PROCEDURE — 96130 PSYCL TST EVAL PHYS/QHP 1ST: CPT | Performed by: CLINICAL NEUROPSYCHOLOGIST

## 2024-09-18 PROCEDURE — 96138 PSYCL/NRPSYC TECH 1ST: CPT | Performed by: CLINICAL NEUROPSYCHOLOGIST

## 2024-09-18 PROCEDURE — 96131 PSYCL TST EVAL PHYS/QHP EA: CPT | Performed by: CLINICAL NEUROPSYCHOLOGIST

## 2024-10-07 ENCOUNTER — PATIENT MESSAGE (OUTPATIENT)
Age: 29
End: 2024-10-07

## 2024-11-22 ENCOUNTER — TELEMEDICINE (OUTPATIENT)
Age: 29
End: 2024-11-22

## 2024-11-22 DIAGNOSIS — F32.1 MODERATE MAJOR DEPRESSION (HCC): Primary | ICD-10-CM

## 2024-11-22 DIAGNOSIS — F41.9 MODERATE ANXIETY: ICD-10-CM

## 2024-11-22 DIAGNOSIS — F43.10 PTSD (POST-TRAUMATIC STRESS DISORDER): ICD-10-CM

## 2024-11-22 DIAGNOSIS — F60.3 BORDERLINE PERSONALITY DISORDER IN ADULT (HCC): ICD-10-CM

## 2024-11-22 NOTE — PROGRESS NOTES
Leslie Welch, was evaluated through a synchronous (real-time) audio-video encounter. The patient (or guardian if applicable) is aware that this is a billable service, which includes applicable co-pays. This Virtual Visit was conducted with patient's (and/or legal guardian's) consent. Patient identification was verified, and a caregiver was present when appropriate.   The patient was located at Home: [address] [address]  Provider was located at Facility (Appt Dept): [address] [place] [address]  [address] [place] [address]  Confirm you are appropriately licensed, registered, or certified to deliver care in the state where the patient is located as indicated above. If you are not or unsure, please re-schedule the visit: Yes, I confirm.     Leslie Welch (:  1995) is a Established patient, presenting virtually for evaluation of the following:        Chief Complaint:  Follow up to discuss test results with this established patient related to neurocognitive and psychologic functioning, cognitive concerns and emotional/mood concerns as outlined below.     This is a teleneuropsychology (audio/visual) visit that was performed with in the originating site at patient's home and the distance site at [facility].   Verbal consent to participate in the video visit was obtained.  This visit occurred during the corona (COVID -19) public health emergency and these visits were authorized by the .   I discussed with the patient the nature of our teleneuropsych visit in that :    - I would evaluate the patient and recommend diagnostics and treatment based on my assessment and impressions, and/or provided test results and discussed these issues with the patient and/or family.    - Our sessions are not being recorded and that personal health information is protected    - Our team will provide follow-up care in person if when

## 2024-12-12 ENCOUNTER — OFFICE VISIT (OUTPATIENT)
Age: 29
End: 2024-12-12
Payer: MEDICAID

## 2024-12-12 ENCOUNTER — TELEPHONE (OUTPATIENT)
Age: 29
End: 2024-12-12

## 2024-12-12 VITALS
TEMPERATURE: 97.6 F | RESPIRATION RATE: 18 BRPM | BODY MASS INDEX: 37.74 KG/M2 | SYSTOLIC BLOOD PRESSURE: 109 MMHG | WEIGHT: 213 LBS | DIASTOLIC BLOOD PRESSURE: 76 MMHG | HEART RATE: 71 BPM | HEIGHT: 63 IN | OXYGEN SATURATION: 98 %

## 2024-12-12 DIAGNOSIS — S39.012S BACK STRAIN, SEQUELA: ICD-10-CM

## 2024-12-12 DIAGNOSIS — F90.0 ATTENTION DEFICIT HYPERACTIVITY DISORDER (ADHD), PREDOMINANTLY INATTENTIVE TYPE: Primary | ICD-10-CM

## 2024-12-12 PROCEDURE — 99213 OFFICE O/P EST LOW 20 MIN: CPT | Performed by: FAMILY MEDICINE

## 2024-12-12 RX ORDER — METHYLPHENIDATE HYDROCHLORIDE 18 MG/1
18 TABLET ORAL EVERY MORNING
Qty: 30 TABLET | Refills: 0 | Status: SHIPPED | OUTPATIENT
Start: 2024-12-12 | End: 2025-01-11

## 2024-12-12 ASSESSMENT — PATIENT HEALTH QUESTIONNAIRE - PHQ9
2. FEELING DOWN, DEPRESSED OR HOPELESS: SEVERAL DAYS
SUM OF ALL RESPONSES TO PHQ QUESTIONS 1-9: 2
SUM OF ALL RESPONSES TO PHQ QUESTIONS 1-9: 2
SUM OF ALL RESPONSES TO PHQ9 QUESTIONS 1 & 2: 2
SUM OF ALL RESPONSES TO PHQ QUESTIONS 1-9: 2
1. LITTLE INTEREST OR PLEASURE IN DOING THINGS: SEVERAL DAYS
SUM OF ALL RESPONSES TO PHQ QUESTIONS 1-9: 2

## 2024-12-12 NOTE — TELEPHONE ENCOUNTER
Pt called to inform you that a PA is needed for the Rx, methylphenidate (CONCERTA) 18 MG extended release tablet

## 2024-12-12 NOTE — PROGRESS NOTES
Leslie Welch is a 29 y.o. female      Chief Complaint   Patient presents with    Results     Patient is coming in to discuss results and discuss possible results. No other concerns.        \"Have you been to the ER, urgent care clinic since your last visit?  Hospitalized since your last visit?\"    NO    “Have you seen or consulted any other health care providers outside of [facility] since your last visit?”    NO              Vitals:    12/12/24 0800   BP: 109/76   Site: Left Upper Arm   Position: Sitting   Pulse: 71   Resp: 18   Temp: 97.6 °F (36.4 °C)   TempSrc: Oral   SpO2: 98%   Weight: 96.6 kg (213 lb)   Height: 1.6 m (5' 3\")            Health Maintenance Due   Topic Date Due    Varicella vaccine (1 of 2 - 13+ 2-dose series) Never done    Hepatitis B vaccine (1 of 3 - 19+ 3-dose series) Never done    Flu vaccine (1) Never done    COVID-19 Vaccine (1 - 2023-24 season) Never done         Medication Reconciliation completed, changes noted.  Please  Update medication list.

## 2024-12-12 NOTE — PROGRESS NOTES
Dnae Franklin [facility] Office Visit     Assessment/ Plan: Leslie Welch is a 29 y.o. 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 Diagnosed with neuropsych. Reviewed other findings including depression, anxiety, PTSD, borderline personality disorder.   -- trial Concerta - counseled on side effects  looks like will need a PA for age >19yo  -- PDMP reviewed and appropriate  -- UDS and controlled substance agreement at follow-up     20 minutes were spent on the day of this encounter both with the patient and in related activities including chart review, care coordination and counseling.     Patient instructions were discussed and/or provided in the AVS. The patient understands and agrees to the plan.    RETURN TO CARE: 1 month for VV check-on on meds   Future Appointments   Date Time Provider Department Center   1/9/2025 10:40 AM Lynne Thomson, DO [facility] ECC DEP         Subjective:  Chief Complaint   Patient presents with    Results     Patient is coming in to discuss results and discuss possible results. No other concerns.      HPI:   Leslie Welch is a 29 y.o. female with PMH of depression, Vitamin D deficiency here for follow-up.     - neuropsych eval in September, results reviewed in November a couple weeks ago  - inattentive ADHD (separate from emotional distress) - consider medication, trauma-informed psychotherapy/EMDR, DBT, TMS, ketamine?  organizational skills training  - depression, anxiety, PTSD, borderline personality   - got hurt at work, lifted wrong and has been on worker's comp  12/4 appt, cleared to go back to work 11/5 but won't accommodate restrictions, not supposed to  lift over 10lbs  - told herniated disc initially but never had MRI, thinks pulled muscle   - got a job with [employer], long process but should know soon, starting salary better  - physical therapy through worker's comp - supposed to be 2x per week for 6w    I

## 2024-12-15 PROBLEM — S39.012A BACK STRAIN: Status: ACTIVE | Noted: 2024-12-15

## 2024-12-15 PROBLEM — F43.10 PTSD (POST-TRAUMATIC STRESS DISORDER): Status: ACTIVE | Noted: 2024-12-15

## 2024-12-15 PROBLEM — F90.0 ATTENTION DEFICIT HYPERACTIVITY DISORDER (ADHD), PREDOMINANTLY INATTENTIVE TYPE: Status: ACTIVE | Noted: 2024-12-15

## 2024-12-15 PROBLEM — F60.3 BORDERLINE PERSONALITY DISORDER (HCC): Status: ACTIVE | Noted: 2024-12-15

## 2025-01-09 ENCOUNTER — TELEMEDICINE (OUTPATIENT)
Age: 30
End: 2025-01-09
Payer: MEDICARE

## 2025-01-09 DIAGNOSIS — F90.0 ATTENTION DEFICIT HYPERACTIVITY DISORDER (ADHD), PREDOMINANTLY INATTENTIVE TYPE: Primary | ICD-10-CM

## 2025-01-09 PROCEDURE — 99213 OFFICE O/P EST LOW 20 MIN: CPT | Performed by: FAMILY MEDICINE

## 2025-01-10 ENCOUNTER — PATIENT MESSAGE (OUTPATIENT)
Age: 30
End: 2025-01-10

## 2025-01-10 DIAGNOSIS — F90.0 ATTENTION DEFICIT HYPERACTIVITY DISORDER (ADHD), PREDOMINANTLY INATTENTIVE TYPE: ICD-10-CM

## 2025-01-10 RX ORDER — METHYLPHENIDATE HYDROCHLORIDE 27 MG/1
27 TABLET ORAL DAILY
Qty: 30 TABLET | Refills: 0 | Status: SHIPPED | OUTPATIENT
Start: 2025-01-10 | End: 2025-02-09

## 2025-01-10 RX ORDER — METHYLPHENIDATE HYDROCHLORIDE 18 MG/1
18 TABLET ORAL EVERY MORNING
Qty: 30 TABLET | Refills: 0 | OUTPATIENT
Start: 2025-01-10 | End: 2025-02-09

## 2025-01-10 NOTE — TELEPHONE ENCOUNTER
Medication Refill Request    Leslie Sultanajessica is requesting a refill of the following medication(s):   Requested Prescriptions     Pending Prescriptions Disp Refills    methylphenidate (CONCERTA) 18 MG extended release tablet 30 tablet 0     Sig: Take 1 tablet by mouth every morning for 30 days. Max Daily Amount: 18 mg        Listed PCP is Lynne Thomson, DO   Last provider to prescribe medication: Berto  Last Date of Medication Prescribed: 12/12/2024  Date of Last Office Visit at [facility]: 12/12/2024  FUTURE APPOINTMENT: No future appointments.    Please send refill to:    [id number] [place], [place] - [id number] [place] [address] -  [phone] - F [phone]  [address] [place] [address]  Phone: [phone] Fax: [phone]      Please review request and approve or deny with recommendations.

## 2025-01-20 NOTE — PROGRESS NOTES
Dane Franklin [facility] Office Visit     Assessment/ Plan: Leslie Welch is a 29 y.o. female presenting for:    ADHD - New diagnosis, no side effects. Some improvement in attention but still not back to work and still poor motivation in mornings. 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   -- increase to Concerta 27mg (from 18mg)   -- sign controlled substance agreement, UDS at f/up visit     15 minutes were spent on the day of this encounter both with the patient and in related activities including chart review, care coordination and counseling.     I affirm this is a Patient Initiated Episode with an Established Patient who has not had a related appointment within my department in the past 7 days or scheduled within the next 24 hours.  Note: not billable if this call serves to triage the patient into an appointment for the relevant concern    RETURN TO CARE: 1 month   Future Appointments   Date Time Provider Department Center   2/6/2025 11:00 AM Lynne Thomson, DO [facility] ECC DEP       Subjective:  No chief complaint on file.    Consent: Leslie Welch, who was seen by synchronous (real-time) audiovideo technology, and/or her healthcare decision maker, is aware that this patient-initiated, Telehealth encounter on 1/9/2025 is a billable service, with coverage as determined by her insurance carrier. She is aware that she may receive a bill and has provided verbal consent to proceed: yes.    HPI: Leslie is a 29 y.o. female with PMH of depression, ADHD, vitamin D deficiency here for follow-up ADHD.     - has only taken med for about 1.5 weeks  - tired first few days taking it but maybe working now  - no side effects  - doesn't have much appetite anyway  - going back to work soon  - hard to get up and out of bed in morning but is up to take care of 3 kids  - no motivation  - sleeps well    I have

## 2025-02-06 ENCOUNTER — TELEMEDICINE (OUTPATIENT)
Age: 30
End: 2025-02-06

## 2025-02-06 DIAGNOSIS — F90.0 ATTENTION DEFICIT HYPERACTIVITY DISORDER (ADHD), PREDOMINANTLY INATTENTIVE TYPE: Primary | ICD-10-CM

## 2025-02-06 RX ORDER — ATOMOXETINE 40 MG/1
40 CAPSULE ORAL DAILY
Qty: 30 CAPSULE | Refills: 0 | Status: SHIPPED | OUTPATIENT
Start: 2025-02-06 | End: 2025-02-06

## 2025-02-06 RX ORDER — ATOMOXETINE 40 MG/1
40 CAPSULE ORAL DAILY
Qty: 30 CAPSULE | Refills: 0 | Status: SHIPPED | OUTPATIENT
Start: 2025-02-06 | End: 2025-03-08

## 2025-02-06 NOTE — PROGRESS NOTES
Dane Franklin [facility] Office Visit     Assessment/ Plan: Leslie Welch is a 29 y.o. female presenting for:    ADHD - New diagnosis, no side effects. Some improvement in attention but still not back to work and still poor motivation in mornings. 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 Expressed concern for abuse with stimulants, will switch.   -- stop Concerta 27mg   -- start Strattera 40mg daily  -- sign controlled substance agreement, UDS at f/up visit     15 minutes were spent on the day of this encounter both with the patient and in related activities including chart review, care coordination and counseling.     I affirm this is a Patient Initiated Episode with an Established Patient who has not had a related appointment within my department in the past 7 days or scheduled within the next 24 hours.  Note: not billable if this call serves to triage the patient into an appointment for the relevant concern    RETURN TO CARE: 1 month   Future Appointments   Date Time Provider Department Center   3/6/2025 10:40 AM Lynne Thomson, DO [facility] ECC DEP       Subjective:  No chief complaint on file.      Consent: Leslie Welch, who was seen by synchronous (real-time) audio visual technology, and/or her healthcare decision maker, is aware that this patient-initiated, Telehealth encounter on 2/6/2025 is a billable service, with coverage as determined by her insurance carrier. She is aware that she may receive a bill and has provided verbal consent to proceed: yes.    HPI: Leslie Welch is a 29 y.o. female with PMH of depression here for ADHD f/up.     - took Adderall and didn't like it  - hasn't noticed much improvement with higher dose   - more of a difference with durg holiday     I have reviewed the patients problem list, current medications, allergies, family, medical and social history. I have

## 2025-03-06 ENCOUNTER — TELEMEDICINE (OUTPATIENT)
Age: 30
End: 2025-03-06
Payer: MEDICARE

## 2025-03-06 DIAGNOSIS — F90.0 ATTENTION DEFICIT HYPERACTIVITY DISORDER (ADHD), PREDOMINANTLY INATTENTIVE TYPE: Primary | ICD-10-CM

## 2025-03-06 PROCEDURE — 99212 OFFICE O/P EST SF 10 MIN: CPT | Performed by: FAMILY MEDICINE

## 2025-03-06 RX ORDER — LISDEXAMFETAMINE DIMESYLATE 30 MG/1
30 CAPSULE ORAL DAILY
Qty: 30 CAPSULE | Refills: 0 | Status: SHIPPED | OUTPATIENT
Start: 2025-03-06 | End: 2025-04-05

## 2025-03-06 SDOH — ECONOMIC STABILITY: FOOD INSECURITY: WITHIN THE PAST 12 MONTHS, YOU WORRIED THAT YOUR FOOD WOULD RUN OUT BEFORE YOU GOT MONEY TO BUY MORE.: PATIENT DECLINED

## 2025-03-06 SDOH — ECONOMIC STABILITY: TRANSPORTATION INSECURITY
IN THE PAST 12 MONTHS, HAS THE LACK OF TRANSPORTATION KEPT YOU FROM MEDICAL APPOINTMENTS OR FROM GETTING MEDICATIONS?: NO

## 2025-03-06 SDOH — ECONOMIC STABILITY: TRANSPORTATION INSECURITY
IN THE PAST 12 MONTHS, HAS LACK OF TRANSPORTATION KEPT YOU FROM MEETINGS, WORK, OR FROM GETTING THINGS NEEDED FOR DAILY LIVING?: NO

## 2025-03-06 SDOH — ECONOMIC STABILITY: FOOD INSECURITY: WITHIN THE PAST 12 MONTHS, THE FOOD YOU BOUGHT JUST DIDN'T LAST AND YOU DIDN'T HAVE MONEY TO GET MORE.: PATIENT DECLINED

## 2025-03-11 NOTE — PROGRESS NOTES
Dane Franklin [facility] Office Visit     Assessment/ Plan: Leslie Welch is a 29 y.o. female presenting for:    ADHD - Uncontrolled, hasn't regularly been back to work. Doesn't feel any improvement with Strattera.   -- start Vyvanse 30mg daily  -- med trials: Concerta (up to 27mg), Strattera (40mg)   -- sign controlled substance agreement, UDS at f/up visit when next in-person    10 minutes were spent on the day of this encounter both with the patient and in related activities including chart review, care coordination and counseling.     I affirm this is a Patient Initiated Episode with an Established Patient who has not had a related appointment within my department in the past 7 days or scheduled within the next 24 hours.  Note: not billable if this call serves to triage the patient into an appointment for the relevant concern    RETURN TO CARE: 1 month   No future appointments.    Subjective:  No chief complaint on file.      Consent: Leslie Welch, who was seen by synchronous (real-time) audio-video technology, and/or her healthcare decision maker, is aware that this patient-initiated, Telehealth encounter on 3/6/2025 is a billable service, with coverage as determined by her insurance carrier. She is aware that she may receive a bill and has provided verbal consent to proceed: yes.    HPI: Leslie Welch is a 29 y.o. female with PMH of ADHD, depression, anxiety, PTSD, Vitamin D deficiency here for ADHD f/up.     - hasn't noticed anything with Strattera  - interested in alternative stimulant, felt like concerta helped  - needs letter for work   - no side effects     I have reviewed the patients problem list, current medications, allergies, family, medical and social history. I have updated them as needed.     Review of Systems  See HPI.    Objective:  There were no vitals taken for this visit.  Physical Exam  Constitutional:       Appearance: Normal appearance.   Pulmonary:

## 2025-03-19 ENCOUNTER — TELEPHONE (OUTPATIENT)
Age: 30
End: 2025-03-19

## 2025-03-19 DIAGNOSIS — F90.0 ATTENTION DEFICIT HYPERACTIVITY DISORDER (ADHD), PREDOMINANTLY INATTENTIVE TYPE: Primary | ICD-10-CM

## 2025-03-19 NOTE — TELEPHONE ENCOUNTER
Patient in office today asking about PA for Vyvanse. No PA request received from [facility]. Please call pharmacy or check if able to do online. Thanks so much!

## 2025-03-21 RX ORDER — LISDEXAMFETAMINE DIMESYLATE 30 MG/1
30 CAPSULE ORAL EVERY MORNING
Qty: 30 CAPSULE | Refills: 0 | Status: SHIPPED | OUTPATIENT
Start: 2025-03-21 | End: 2025-04-20

## 2025-03-21 NOTE — TELEPHONE ENCOUNTER
Reviewed PA - denied because not name brand Vyvanse. Resent name brand. Please call pharmacy to confirm will go through and then message/call patient to let her know. Thanks!

## 2025-03-24 ENCOUNTER — PATIENT MESSAGE (OUTPATIENT)
Age: 30
End: 2025-03-24

## 2025-04-13 DIAGNOSIS — F90.0 ATTENTION DEFICIT HYPERACTIVITY DISORDER (ADHD), PREDOMINANTLY INATTENTIVE TYPE: ICD-10-CM

## 2025-04-14 NOTE — TELEPHONE ENCOUNTER
Medication Refill Request    Lesliesummer Sultanajessica is requesting a refill of the following medication(s):   Requested Prescriptions     Pending Prescriptions Disp Refills    VYVANSE 30 MG capsule 30 capsule 0     Sig: Take 1 capsule by mouth every morning for 30 days. Max Daily Amount: 30 mg        Listed PCP is Lynne Thomson, DO   Last provider to prescribe medication: Dr. Thomson on 03/21/2025  Date of Last Office Visit at [facility]: 3/6/2025 with Dr. Thomson    FUTURE [facility] APPOINTMENT: Visit date not found    Please send refill to:    [id number] [place], [place] - [address] [facility] [address] [phone] - F [phone] [address] [facility] [address]  Phone: [phone] Fax: [phone]      Please review request and approve or deny with recommendations within 48 hours.

## 2025-04-15 RX ORDER — LISDEXAMFETAMINE DIMESYLATE 30 MG/1
30 CAPSULE ORAL EVERY MORNING
Qty: 30 CAPSULE | Refills: 0 | Status: SHIPPED | OUTPATIENT
Start: 2025-04-18 | End: 2025-05-18

## 2025-04-15 NOTE — TELEPHONE ENCOUNTER
1-month refill of Vyvanse 30mg daily sent. Will need in-person appointment for future refills. Please reach out to patient to schedule. Thanks!

## 2025-05-05 ENCOUNTER — OFFICE VISIT (OUTPATIENT)
Age: 30
End: 2025-05-05

## 2025-05-05 DIAGNOSIS — Z78.9 NEED FOR FOLLOW-UP BY SOCIAL WORKER: Primary | ICD-10-CM

## 2025-05-07 NOTE — PROGRESS NOTES
Resource visit with [employer] Navigator.    Patient reports DSS has failed to provide SNAP and childcare assistance despite patient being unemployed and not receiving child support. Per patient, currently  and although father of children is supposed to be paying child support, he has not.     Patient has attempted to reach caseworkers with no success. SW emailed [employer] to check status of application. Patient advised that she will likely need to reapply or appeal decision if determination made in last 30 days.    SW to contact patient once response is received from [facility].    Baby diapers and wipes provided to patient during visit.    Plan:  Ongoing psychosocial support and resource referral, as desired by the patient and family.      [facility]   Navigator

## 2025-05-23 ENCOUNTER — OFFICE VISIT (OUTPATIENT)
Age: 30
End: 2025-05-23
Payer: MEDICAID

## 2025-05-23 VITALS
OXYGEN SATURATION: 98 % | HEART RATE: 85 BPM | HEIGHT: 63 IN | BODY MASS INDEX: 37.92 KG/M2 | SYSTOLIC BLOOD PRESSURE: 101 MMHG | RESPIRATION RATE: 18 BRPM | TEMPERATURE: 97.8 F | WEIGHT: 214 LBS | DIASTOLIC BLOOD PRESSURE: 66 MMHG

## 2025-05-23 DIAGNOSIS — E55.9 VITAMIN D DEFICIENCY: ICD-10-CM

## 2025-05-23 DIAGNOSIS — F90.9 ATTENTION DEFICIT HYPERACTIVITY DISORDER (ADHD), UNSPECIFIED ADHD TYPE: Primary | ICD-10-CM

## 2025-05-23 DIAGNOSIS — F90.0 ATTENTION DEFICIT HYPERACTIVITY DISORDER (ADHD), PREDOMINANTLY INATTENTIVE TYPE: ICD-10-CM

## 2025-05-23 DIAGNOSIS — F33.1 MODERATE EPISODE OF RECURRENT MAJOR DEPRESSIVE DISORDER (HCC): ICD-10-CM

## 2025-05-23 PROCEDURE — 99214 OFFICE O/P EST MOD 30 MIN: CPT | Performed by: FAMILY MEDICINE

## 2025-05-23 SDOH — ECONOMIC STABILITY: FOOD INSECURITY: WITHIN THE PAST 12 MONTHS, THE FOOD YOU BOUGHT JUST DIDN'T LAST AND YOU DIDN'T HAVE MONEY TO GET MORE.: NEVER TRUE

## 2025-05-23 SDOH — ECONOMIC STABILITY: FOOD INSECURITY: WITHIN THE PAST 12 MONTHS, YOU WORRIED THAT YOUR FOOD WOULD RUN OUT BEFORE YOU GOT MONEY TO BUY MORE.: NEVER TRUE

## 2025-05-23 ASSESSMENT — PATIENT HEALTH QUESTIONNAIRE - PHQ9
8. MOVING OR SPEAKING SO SLOWLY THAT OTHER PEOPLE COULD HAVE NOTICED. OR THE OPPOSITE, BEING SO FIGETY OR RESTLESS THAT YOU HAVE BEEN MOVING AROUND A LOT MORE THAN USUAL: NOT AT ALL
4. FEELING TIRED OR HAVING LITTLE ENERGY: NEARLY EVERY DAY
3. TROUBLE FALLING OR STAYING ASLEEP: MORE THAN HALF THE DAYS
7. TROUBLE CONCENTRATING ON THINGS, SUCH AS READING THE NEWSPAPER OR WATCHING TELEVISION: NEARLY EVERY DAY
5. POOR APPETITE OR OVEREATING: NEARLY EVERY DAY
2. FEELING DOWN, DEPRESSED OR HOPELESS: MORE THAN HALF THE DAYS
SUM OF ALL RESPONSES TO PHQ QUESTIONS 1-9: 18
SUM OF ALL RESPONSES TO PHQ QUESTIONS 1-9: 18
9. THOUGHTS THAT YOU WOULD BE BETTER OFF DEAD, OR OF HURTING YOURSELF: NOT AT ALL
10. IF YOU CHECKED OFF ANY PROBLEMS, HOW DIFFICULT HAVE THESE PROBLEMS MADE IT FOR YOU TO DO YOUR WORK, TAKE CARE OF THINGS AT HOME, OR GET ALONG WITH OTHER PEOPLE: EXTREMELY DIFFICULT
SUM OF ALL RESPONSES TO PHQ QUESTIONS 1-9: 18
1. LITTLE INTEREST OR PLEASURE IN DOING THINGS: NEARLY EVERY DAY
SUM OF ALL RESPONSES TO PHQ QUESTIONS 1-9: 18
6. FEELING BAD ABOUT YOURSELF - OR THAT YOU ARE A FAILURE OR HAVE LET YOURSELF OR YOUR FAMILY DOWN: MORE THAN HALF THE DAYS

## 2025-05-23 NOTE — PROGRESS NOTES
Dane Franklin [facility] Office Visit     Assessment/ Plan: Leslie Welch is a 29 y.o. female presenting for:    ADHD - Improved since starting Vyvanse, minimal side effects but overall coping okay and would like to continue.   -- start Vyvanse 30mg daily  -- med trials: Concerta (up to 27mg), Strattera (40mg)   -- controlled substance agreement: 5/23/25  -- UDS: 5/23/25 appropriate    Depression - Chronic, maybe worsening. Wondering about going back on Seroquel versus alternative. Planning to restart Seroquel.     Vitamin D Deficiency - Recommended continuing daily supplementation.     20 minutes were spent on the day of this encounter both with the patient and in related activities including chart review, care coordination and counseling.     Patient instructions were discussed and/or provided in the AVS. The patient understands and agrees to the plan.    RETURN TO CARE: 3 months   No future appointments.    Subjective:  Chief Complaint   Patient presents with    Depression     Patient is coming in to discuss depression. She has very low appetite. No other concerns.      HPI:   Leslie Welch is a 29 y.o. female with PMH of ADHD, depression here for follow-up.     Work  - unfit - eviction 2020 on record  3 debts that went to collections   - can appeal it, has been waiting since October   - accepted for food stamps  - should be getting child support     Depression   - low appetite - making it worse   - used to eat 2-3 times but now once a day   - only time eating more is before/after period   - zoloft didn't help  - has 1-2 more of weekly Vitamin D high dose   - Seroquel ?    ADHD  - Vyvanse helping, takes at 8am   - sleeping no worse but still not great     I have reviewed the patients problem list, current medications, allergies, family, medical and social history. I have updated them as needed.     Review of Systems  See HPI.    Objective:  /66 (BP Site: Right Upper Arm, Patient

## 2025-05-23 NOTE — PROGRESS NOTES
Leslie Welch is a 29 y.o. female    Identified pt with two pt identifiers(name and ). Reviewed record in preparation for visit and have obtained necessary documentation.    Chief Complaint   Patient presents with    Depression     Patient is coming in to discuss depression. She has very low appetite. No other concerns.        \"Have you been to the ER, urgent care clinic since your last visit?  Hospitalized since your last visit?\"    NO    “Have you seen or consulted any other health care providers outside of [facility] since your last visit?”    NO              Vitals:    25 1414   BP: 101/66   BP Site: Right Upper Arm   Patient Position: Sitting   BP Cuff Size: Large Adult   Pulse: 85   Resp: 18   Temp: 97.8 °F (36.6 °C)   TempSrc: Oral   SpO2: 98%   Weight: 97.1 kg (214 lb)   Height: 1.6 m (5' 3\")            Health Maintenance Due   Topic Date Due    Varicella vaccine (1 of 2 - 13+ 2-dose series) Never done    Hepatitis B vaccine (1 of 3 - 19+ 3-dose series) Never done    COVID-19 Vaccine ( season) Never done       Click Here for Release of Records Request     Medication Reconciliation completed, changes noted.  Please  Update medication list.

## 2025-05-24 LAB
AMPHET UR QL SCN: POSITIVE
BARBITURATES UR QL SCN: NEGATIVE
BENZODIAZ UR QL: NEGATIVE
CANNABINOIDS UR QL SCN: NEGATIVE
COCAINE UR QL SCN: NEGATIVE
Lab: ABNORMAL
METHADONE UR QL: NEGATIVE
OPIATES UR QL: NEGATIVE
PCP UR QL: NEGATIVE

## 2025-05-25 DIAGNOSIS — F90.0 ATTENTION DEFICIT HYPERACTIVITY DISORDER (ADHD), PREDOMINANTLY INATTENTIVE TYPE: ICD-10-CM

## 2025-05-25 RX ORDER — LISDEXAMFETAMINE DIMESYLATE 30 MG/1
30 CAPSULE ORAL EVERY MORNING
Qty: 30 CAPSULE | Refills: 0 | Status: CANCELLED | OUTPATIENT
Start: 2025-05-25 | End: 2025-06-24

## 2025-05-27 RX ORDER — LISDEXAMFETAMINE DIMESYLATE 30 MG/1
30 CAPSULE ORAL EVERY MORNING
Qty: 30 CAPSULE | Refills: 0 | Status: SHIPPED | OUTPATIENT
Start: 2025-06-24 | End: 2025-07-24

## 2025-05-27 RX ORDER — LISDEXAMFETAMINE DIMESYLATE 30 MG/1
30 CAPSULE ORAL EVERY MORNING
Qty: 30 CAPSULE | Refills: 0 | Status: SHIPPED | OUTPATIENT
Start: 2025-07-22 | End: 2025-08-21

## 2025-05-27 RX ORDER — LISDEXAMFETAMINE DIMESYLATE 30 MG/1
30 CAPSULE ORAL EVERY MORNING
Qty: 30 CAPSULE | Refills: 0 | Status: SHIPPED | OUTPATIENT
Start: 2025-05-27 | End: 2025-06-26

## 2025-05-27 NOTE — TELEPHONE ENCOUNTER
Medication Refill Request    Leslie Rebekah is requesting a refill of the following medication(s):   Requested Prescriptions     Pending Prescriptions Disp Refills    VYVANSE 30 MG capsule 30 capsule 0     Sig: Take 1 capsule by mouth every morning for 30 days. Max Daily Amount: 30 mg        Listed PCP is Lynne Thomson, DO   Last provider to prescribe medication: Rebekah  Last Date of Medication Prescribed: 04/18/2025  Date of Last Office Visit at [facility]: 05/05/2025  FUTURE APPOINTMENT: No future appointments.    Please send refill to:    [id number] [place], [place] - [address] [facility] [facility] [phone] - F [phone] [address] [facility] [address]  Phone: [phone] Fax: [phone]    Please review request and approve or deny with recommendations.

## 2025-06-02 ENCOUNTER — HOSPITAL ENCOUNTER (EMERGENCY)
Facility: HOSPITAL | Age: 30
Discharge: HOME | End: 2025-06-02
Attending: STUDENT IN AN ORGANIZED HEALTH CARE EDUCATION/TRAINING PROGRAM
Payer: MEDICAID

## 2025-06-02 VITALS
SYSTOLIC BLOOD PRESSURE: 117 MMHG | HEIGHT: 63 IN | OXYGEN SATURATION: 96 % | TEMPERATURE: 98 F | WEIGHT: 214 LBS | DIASTOLIC BLOOD PRESSURE: 89 MMHG | BODY MASS INDEX: 37.92 KG/M2 | HEART RATE: 70 BPM | RESPIRATION RATE: 16 BRPM

## 2025-06-02 DIAGNOSIS — G89.29 ACUTE EXACERBATION OF CHRONIC LOW BACK PAIN: Primary | ICD-10-CM

## 2025-06-02 DIAGNOSIS — M54.50 ACUTE EXACERBATION OF CHRONIC LOW BACK PAIN: Primary | ICD-10-CM

## 2025-06-02 PROCEDURE — 99284 EMERGENCY DEPT VISIT MOD MDM: CPT

## 2025-06-02 PROCEDURE — 6370000000 HC RX 637 (ALT 250 FOR IP): Performed by: STUDENT IN AN ORGANIZED HEALTH CARE EDUCATION/TRAINING PROGRAM

## 2025-06-02 PROCEDURE — 6360000002 HC RX W HCPCS: Performed by: STUDENT IN AN ORGANIZED HEALTH CARE EDUCATION/TRAINING PROGRAM

## 2025-06-02 PROCEDURE — 96372 THER/PROPH/DIAG INJ SC/IM: CPT

## 2025-06-02 RX ORDER — KETOROLAC TROMETHAMINE 10 MG/1
10 TABLET, FILM COATED ORAL EVERY 6 HOURS PRN
Qty: 12 TABLET | Refills: 0 | Status: SHIPPED | OUTPATIENT
Start: 2025-06-02 | End: 2025-06-05

## 2025-06-02 RX ORDER — CYCLOBENZAPRINE HCL 10 MG
10 TABLET ORAL
Status: COMPLETED | OUTPATIENT
Start: 2025-06-02 | End: 2025-06-02

## 2025-06-02 RX ORDER — DEXAMETHASONE SODIUM PHOSPHATE 10 MG/ML
10 INJECTION, SOLUTION INTRAMUSCULAR; INTRAVENOUS ONCE
Status: COMPLETED | OUTPATIENT
Start: 2025-06-02 | End: 2025-06-02

## 2025-06-02 RX ORDER — CYCLOBENZAPRINE HCL 10 MG
10 TABLET ORAL 3 TIMES DAILY PRN
Qty: 15 TABLET | Refills: 0 | Status: SHIPPED | OUTPATIENT
Start: 2025-06-02 | End: 2025-06-07

## 2025-06-02 RX ORDER — PREDNISONE 50 MG/1
50 TABLET ORAL DAILY
Qty: 3 TABLET | Refills: 0 | Status: SHIPPED | OUTPATIENT
Start: 2025-06-02 | End: 2025-06-05

## 2025-06-02 RX ORDER — KETOROLAC TROMETHAMINE 30 MG/ML
30 INJECTION, SOLUTION INTRAMUSCULAR; INTRAVENOUS ONCE
Status: COMPLETED | OUTPATIENT
Start: 2025-06-02 | End: 2025-06-02

## 2025-06-02 RX ADMIN — CYCLOBENZAPRINE HYDROCHLORIDE 10 MG: 10 TABLET, FILM COATED ORAL at 02:08

## 2025-06-02 RX ADMIN — DEXAMETHASONE SODIUM PHOSPHATE 10 MG: 10 INJECTION, SOLUTION INTRAMUSCULAR; INTRAVENOUS at 02:08

## 2025-06-02 RX ADMIN — KETOROLAC TROMETHAMINE 30 MG: 30 INJECTION, SOLUTION INTRAMUSCULAR at 02:08

## 2025-06-02 ASSESSMENT — PAIN SCALES - GENERAL
PAINLEVEL_OUTOF10: 9
PAINLEVEL_OUTOF10: 3

## 2025-06-02 ASSESSMENT — PAIN - FUNCTIONAL ASSESSMENT: PAIN_FUNCTIONAL_ASSESSMENT: 0-10

## 2025-06-02 NOTE — ED TRIAGE NOTES
Low back pain since work injury in Oct. Patient has been seen by ortho and completed CT. Back pain worse suddenly while standing. Worse with movement. Denies urinary and bowel problems   
Implemented All Universal Safety Interventions:  [place] to call system. Call bell, personal items and telephone within reach. Instruct patient to call for assistance. Room bathroom lighting operational. Non-slip footwear when patient is off stretcher. Physically safe environment: no spills, clutter or unnecessary equipment. Stretcher in lowest position, wheels locked, appropriate side rails in place.

## 2025-06-02 NOTE — ED PROVIDER NOTES
Herniated disc: Considered due to severe back pain radiating down the left leg and difficulty ambulating; less likely as there is no new weakness, bowel/bladder dysfunction, or sensory loss on exam.  - Spinal epidural abscess: Considered due to acute severe back pain; less likely given absence of fever, IV drug use, immunosuppression, or neurologic deficits.  - Vertebral osteomyelitis: Considered because of severe back pain; less likely due to lack of fever, weight loss, immunosuppression, or history of recent infection.  - Spinal metastasis: Considered due to severe back pain; less likely as there is no history of cancer, weight loss, or systemic symptoms.  - Cauda equina syndrome: Considered due to severe back pain and difficulty ambulating; less likely as there is no bowel or bladder dysfunction, saddle anesthesia, or lower extremity weakness on exam.    DISPOSITION  Discharge: Home    Steroids, anti-inflammatories, and muscle relaxers prescribed; follow-up with orthopedics or pain management if symptoms do not improve. I explained to the patient that if their symptoms are not improving as expected or if other new symptoms or signs of concern develop, other etiologies or diagnoses may need to be considered requiring other tests, treatments, consultations, and/or admission. The diagnosis, plan, expected course, follow-up, and return precautions were discussed and all questions were answered.    DIAGNOSIS  Primary Diagnosis: Acute exacerbation of chronic back pain       Guanakito Vargas MD  06/02/25 8265

## 2025-06-18 ENCOUNTER — RESULTS FOLLOW-UP (OUTPATIENT)
Age: 30
End: 2025-06-18

## 2025-06-18 RX ORDER — QUETIAPINE FUMARATE 50 MG/1
50 TABLET, EXTENDED RELEASE ORAL NIGHTLY
COMMUNITY